# Patient Record
Sex: MALE | Race: WHITE | HISPANIC OR LATINO | Employment: PART TIME | URBAN - METROPOLITAN AREA
[De-identification: names, ages, dates, MRNs, and addresses within clinical notes are randomized per-mention and may not be internally consistent; named-entity substitution may affect disease eponyms.]

---

## 2018-04-23 ENCOUNTER — EVALUATION (OUTPATIENT)
Dept: PHYSICAL THERAPY | Facility: CLINIC | Age: 63
End: 2018-04-23
Payer: COMMERCIAL

## 2018-04-23 DIAGNOSIS — S82.402D CLOSED FRACTURE OF SHAFT OF LEFT TIBIA AND FIBULA WITH ROUTINE HEALING, SUBSEQUENT ENCOUNTER: Primary | ICD-10-CM

## 2018-04-23 DIAGNOSIS — G89.29 CHRONIC PAIN OF LEFT KNEE: ICD-10-CM

## 2018-04-23 DIAGNOSIS — S82.202D CLOSED FRACTURE OF SHAFT OF LEFT TIBIA AND FIBULA WITH ROUTINE HEALING, SUBSEQUENT ENCOUNTER: Primary | ICD-10-CM

## 2018-04-23 DIAGNOSIS — M25.562 CHRONIC PAIN OF LEFT KNEE: ICD-10-CM

## 2018-04-23 PROCEDURE — G8979 MOBILITY GOAL STATUS: HCPCS | Performed by: PHYSICAL THERAPIST

## 2018-04-23 PROCEDURE — 97162 PT EVAL MOD COMPLEX 30 MIN: CPT | Performed by: PHYSICAL THERAPIST

## 2018-04-23 PROCEDURE — 97535 SELF CARE MNGMENT TRAINING: CPT | Performed by: PHYSICAL THERAPIST

## 2018-04-23 PROCEDURE — G8978 MOBILITY CURRENT STATUS: HCPCS | Performed by: PHYSICAL THERAPIST
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
## 2018-04-30 ENCOUNTER — APPOINTMENT (OUTPATIENT)
Dept: PHYSICAL THERAPY | Facility: CLINIC | Age: 63
End: 2018-04-30
Payer: COMMERCIAL

## 2018-05-02 ENCOUNTER — OFFICE VISIT (OUTPATIENT)
Dept: PHYSICAL THERAPY | Facility: CLINIC | Age: 63
End: 2018-05-02
Payer: COMMERCIAL

## 2018-05-02 DIAGNOSIS — S82.202D CLOSED FRACTURE OF SHAFT OF LEFT TIBIA AND FIBULA WITH ROUTINE HEALING, SUBSEQUENT ENCOUNTER: Primary | ICD-10-CM

## 2018-05-02 DIAGNOSIS — G89.29 CHRONIC PAIN OF LEFT KNEE: ICD-10-CM

## 2018-05-02 DIAGNOSIS — S82.402D CLOSED FRACTURE OF SHAFT OF LEFT TIBIA AND FIBULA WITH ROUTINE HEALING, SUBSEQUENT ENCOUNTER: Primary | ICD-10-CM

## 2018-05-02 DIAGNOSIS — M25.562 CHRONIC PAIN OF LEFT KNEE: ICD-10-CM

## 2018-05-02 PROCEDURE — 97110 THERAPEUTIC EXERCISES: CPT | Performed by: PHYSICAL THERAPIST

## 2018-05-02 PROCEDURE — 97140 MANUAL THERAPY 1/> REGIONS: CPT | Performed by: PHYSICAL THERAPIST

## 2018-05-02 NOTE — PROGRESS NOTES
Daily Note     Today's date: 2018  Patient name: La Bullock  : 1955  MRN: [id number]  Referring provider: Bette Nelson MD  Dx:   Encounter Diagnosis     ICD-10-CM    1  Closed fracture of shaft of left tibia and fibula with routine healing, subsequent encounter S82 202D     S82 402D    2  Chronic pain of left knee M25 562     G89 29                   Subjective: Pt with no new complaints at this time  Pt notes pain has not changed much since IE and continues to note cramping in the R calf with extended periods of walking      Objective: See treatment diary below  Manual  18         BLE PROM    15 min                                                                       Exercise Diary  18         NuStep   L1 10 min         TR/HR   20x         Mini Squats   20x         Standing SLR 3-way   20x         Fwd/Lat Step Ups   20x         Tandem Stance            U Stance            Supine Hip Abd   30x 5" hold         Supine Hip Add   30x Blue         Bridges   30x         SLR            Calf stretch   5x15" hold                                                                                                                               Modalities 18         CP/MHP prn    NP                                            Assessment: Tolerated treatment well  Patient exhibited good technique with therapeutic exercises and would benefit from continued PT  Pt with good tolerance to exercises this visit, no increased pain noted  Plan: Continue per plan of care  Progress treatment as tolerated

## 2018-05-07 ENCOUNTER — OFFICE VISIT (OUTPATIENT)
Dept: PHYSICAL THERAPY | Facility: CLINIC | Age: 63
End: 2018-05-07
Payer: COMMERCIAL

## 2018-05-07 DIAGNOSIS — S82.402D CLOSED FRACTURE OF SHAFT OF LEFT TIBIA AND FIBULA WITH ROUTINE HEALING, SUBSEQUENT ENCOUNTER: Primary | ICD-10-CM

## 2018-05-07 DIAGNOSIS — S82.202D CLOSED FRACTURE OF SHAFT OF LEFT TIBIA AND FIBULA WITH ROUTINE HEALING, SUBSEQUENT ENCOUNTER: Primary | ICD-10-CM

## 2018-05-07 PROCEDURE — 97140 MANUAL THERAPY 1/> REGIONS: CPT

## 2018-05-07 PROCEDURE — 97110 THERAPEUTIC EXERCISES: CPT

## 2018-05-07 NOTE — PROGRESS NOTES
Daily Note     Today's date: 2018  Patient name: Aryan Greenwood  : 1955  MRN: [id number]  Referring provider: Sacha José MD  Dx:   Encounter Diagnosis     ICD-10-CM    1  Closed fracture of shaft of left tibia and fibula with routine healing, subsequent encounter S82 202D     S82 402D                   Subjective:  I always have pain everyone'        Objective: See treatment diary below      Assessment: Tolerated treatment fair  Patient demonstrated fatigue post treatment      Required VC for proper technique  Stressed importance of complying with HEP  Plan: Continue per plan of care         Manual  18       BLE PROM    15 min  15                                                                     Exercise Diary  18       NuStep   L1 10 min  L1 10       TR/HR   20x  20       Mini Squats   20x  20       Standing SLR 3-way   20x  20       Fwd/Lat Step Ups   20x  20       Tandem Stance            U Stance            Supine Hip Abd   30x 5" hold         Supine Hip Add   30x Blue         Bridges   30x         SLR            Calf stretch   5x15" hold                                                                                                                               Modalities 18       CP/MHP prn    NP

## 2018-05-18 ENCOUNTER — TELEPHONE (OUTPATIENT)
Dept: PAIN MEDICINE | Facility: MEDICAL CENTER | Age: 63
End: 2018-05-18

## 2018-05-18 NOTE — TELEPHONE ENCOUNTER
[address]- patient called to schedule  Intake completed and sent to ES office  Patient to have order and office notes sent to PCP  Patient to also have prior pain records sent for review  Fax# given
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
## 2018-05-30 ENCOUNTER — TELEPHONE (OUTPATIENT)
Dept: NEUROLOGY | Facility: CLINIC | Age: 63
End: 2018-05-30

## 2018-05-30 NOTE — TELEPHONE ENCOUNTER
Moriah from pt's prior pm doctor called and will be faxing over the office notes and med list today

## 2018-05-30 NOTE — TELEPHONE ENCOUNTER
Pt called to see what the next step will be, informed pt of previous task  Pt is aware that we are still awaiting records from previous pain records  Pt is going to call previous pain doctor so that they can fax over records  Fax number given

## 2018-05-30 NOTE — TELEPHONE ENCOUNTER
I called pt back and lm on vm to let him know we haven't received the order or med recs  I called his pcp and she will fax the order

## 2018-05-31 NOTE — TELEPHONE ENCOUNTER
LM on home vm, the mobile vm is full, for pt to call back  Dr Laura Bell reviewed his paperwork and the note on the paperwork is do not schedule

## 2018-06-01 NOTE — TELEPHONE ENCOUNTER
Pt returned call and was made aware that Dr Irasema Badillo does not have anything to offer him  Pt is requesting a call back to explain why he will not see him  States that his current pain specialist will no longer be practicing as of this month and he needs to find another pain doctor to continue his care  He does not know what else to do at this point  Pt can be reached at [phone]

## 2018-06-05 NOTE — TELEPHONE ENCOUNTER
Please advise patient that he is on high dose opiates (oxycodone 30mg 3x/day); and I do not feel comfortable taking over his opiate regimen  Thank you

## 2019-08-08 ENCOUNTER — HOSPITAL ENCOUNTER (OUTPATIENT)
Facility: HOSPITAL | Age: 64
Setting detail: OBSERVATION
Discharge: HOME | End: 2019-08-09
Attending: EMERGENCY MEDICINE | Admitting: INTERNAL MEDICINE
Payer: COMMERCIAL

## 2019-08-08 ENCOUNTER — APPOINTMENT (EMERGENCY)
Dept: RADIOLOGY | Facility: HOSPITAL | Age: 64
End: 2019-08-08
Payer: COMMERCIAL

## 2019-08-08 ENCOUNTER — APPOINTMENT (EMERGENCY)
Dept: CT IMAGING | Facility: HOSPITAL | Age: 64
End: 2019-08-08
Payer: COMMERCIAL

## 2019-08-08 DIAGNOSIS — R77.8 ELEVATED TROPONIN: ICD-10-CM

## 2019-08-08 DIAGNOSIS — R42 DIZZINESS: Primary | ICD-10-CM

## 2019-08-08 DIAGNOSIS — N18.9 CKD (CHRONIC KIDNEY DISEASE): ICD-10-CM

## 2019-08-08 DIAGNOSIS — D72.829 LEUKOCYTOSIS: ICD-10-CM

## 2019-08-08 PROBLEM — I21.4 NSTEMI (NON-ST ELEVATED MYOCARDIAL INFARCTION) (HCC): Status: ACTIVE | Noted: 2019-08-08

## 2019-08-08 PROBLEM — I25.10 ASHD (ARTERIOSCLEROTIC HEART DISEASE): Status: ACTIVE | Noted: 2017-05-23

## 2019-08-08 PROBLEM — C64.1 RENAL CELL CARCINOMA, RIGHT (HCC): Status: ACTIVE | Noted: 2017-08-23

## 2019-08-08 PROBLEM — R79.89 ELEVATED TROPONIN: Status: ACTIVE | Noted: 2019-08-08

## 2019-08-08 PROBLEM — N18.30 ACUTE RENAL FAILURE SUPERIMPOSED ON STAGE 3 CHRONIC KIDNEY DISEASE (HCC): Status: ACTIVE | Noted: 2019-08-08

## 2019-08-08 PROBLEM — N17.9 ACUTE RENAL FAILURE SUPERIMPOSED ON STAGE 3 CHRONIC KIDNEY DISEASE (HCC): Status: ACTIVE | Noted: 2019-08-08

## 2019-08-08 LAB
ALBUMIN SERPL BCP-MCNC: 4.2 G/DL (ref 3.5–5.7)
ALP SERPL-CCNC: 58 U/L (ref 55–165)
ALT SERPL W P-5'-P-CCNC: 9 U/L (ref 7–52)
ANION GAP SERPL CALCULATED.3IONS-SCNC: 8 MMOL/L (ref 4–13)
APTT PPP: 32 SECONDS (ref 23–37)
AST SERPL W P-5'-P-CCNC: 12 U/L (ref 13–39)
ATRIAL RATE: 100 BPM
BASOPHILS # BLD AUTO: 0 THOUSANDS/ΜL (ref 0–0.1)
BASOPHILS NFR BLD AUTO: 0 % (ref 0–2)
BILIRUB SERPL-MCNC: 0.6 MG/DL (ref 0.2–1)
BNP SERPL-MCNC: 112 PG/ML (ref 1–100)
BUN SERPL-MCNC: 20 MG/DL (ref 7–25)
CALCIUM SERPL-MCNC: 9.5 MG/DL (ref 8.6–10.5)
CHLORIDE SERPL-SCNC: 103 MMOL/L (ref 98–107)
CO2 SERPL-SCNC: 24 MMOL/L (ref 21–31)
CREAT SERPL-MCNC: 1.63 MG/DL (ref 0.7–1.3)
EOSINOPHIL # BLD AUTO: 0 THOUSAND/ΜL (ref 0–0.61)
EOSINOPHIL NFR BLD AUTO: 0 % (ref 0–5)
ERYTHROCYTE [DISTWIDTH] IN BLOOD BY AUTOMATED COUNT: 13.9 % (ref 11.5–14.5)
GFR SERPL CREATININE-BSD FRML MDRD: 43 ML/MIN/1.73SQ M
GLUCOSE SERPL-MCNC: 139 MG/DL (ref 65–99)
HCT VFR BLD AUTO: 45.4 % (ref 42–47)
HGB BLD-MCNC: 16.1 G/DL (ref 14–18)
INR PPP: 1.09 (ref 0.9–1.5)
LYMPHOCYTES # BLD AUTO: 1.2 THOUSANDS/ΜL (ref 0.6–4.47)
LYMPHOCYTES NFR BLD AUTO: 7 % (ref 21–51)
MCH RBC QN AUTO: 32.7 PG (ref 26–34)
MCHC RBC AUTO-ENTMCNC: 35.4 G/DL (ref 31–37)
MCV RBC AUTO: 92 FL (ref 81–99)
MONOCYTES # BLD AUTO: 0.9 THOUSAND/ΜL (ref 0.17–1.22)
MONOCYTES NFR BLD AUTO: 5 % (ref 2–12)
NEUTROPHILS # BLD AUTO: 15 THOUSANDS/ΜL (ref 1.4–6.5)
NEUTS SEG NFR BLD AUTO: 87 % (ref 42–75)
P AXIS: 81 DEGREES
PLATELET # BLD AUTO: 172 THOUSANDS/UL (ref 149–390)
PMV BLD AUTO: 8.3 FL (ref 8.6–11.7)
POTASSIUM SERPL-SCNC: 4.5 MMOL/L (ref 3.5–5.5)
PR INTERVAL: 148 MS
PROT SERPL-MCNC: 6.8 G/DL (ref 6.4–8.9)
PROTHROMBIN TIME: 12.6 SECONDS (ref 10.2–13)
QRS AXIS: 30 DEGREES
QRSD INTERVAL: 92 MS
QT INTERVAL: 344 MS
QTC INTERVAL: 443 MS
RBC # BLD AUTO: 4.92 MILLION/UL (ref 4.3–5.9)
SODIUM SERPL-SCNC: 135 MMOL/L (ref 134–143)
T WAVE AXIS: 61 DEGREES
TROPONIN I SERPL-MCNC: 0.17 NG/ML
TROPONIN I SERPL-MCNC: 0.27 NG/ML
TROPONIN I SERPL-MCNC: 0.5 NG/ML
VENTRICULAR RATE: 100 BPM
WBC # BLD AUTO: 17.2 THOUSAND/UL (ref 4.8–10.8)

## 2019-08-08 PROCEDURE — 84484 ASSAY OF TROPONIN QUANT: CPT | Performed by: EMERGENCY MEDICINE

## 2019-08-08 PROCEDURE — 96361 HYDRATE IV INFUSION ADD-ON: CPT

## 2019-08-08 PROCEDURE — 85025 COMPLETE CBC W/AUTO DIFF WBC: CPT | Performed by: EMERGENCY MEDICINE

## 2019-08-08 PROCEDURE — 99220 PR INITIAL OBSERVATION CARE/DAY 70 MINUTES: CPT | Performed by: INTERNAL MEDICINE

## 2019-08-08 PROCEDURE — 80053 COMPREHEN METABOLIC PANEL: CPT | Performed by: EMERGENCY MEDICINE

## 2019-08-08 PROCEDURE — 71045 X-RAY EXAM CHEST 1 VIEW: CPT

## 2019-08-08 PROCEDURE — 85610 PROTHROMBIN TIME: CPT | Performed by: EMERGENCY MEDICINE

## 2019-08-08 PROCEDURE — 93010 ELECTROCARDIOGRAM REPORT: CPT | Performed by: INTERNAL MEDICINE

## 2019-08-08 PROCEDURE — 83880 ASSAY OF NATRIURETIC PEPTIDE: CPT | Performed by: EMERGENCY MEDICINE

## 2019-08-08 PROCEDURE — 99285 EMERGENCY DEPT VISIT HI MDM: CPT

## 2019-08-08 PROCEDURE — 96360 HYDRATION IV INFUSION INIT: CPT

## 2019-08-08 PROCEDURE — 85730 THROMBOPLASTIN TIME PARTIAL: CPT | Performed by: EMERGENCY MEDICINE

## 2019-08-08 PROCEDURE — 36415 COLL VENOUS BLD VENIPUNCTURE: CPT | Performed by: EMERGENCY MEDICINE

## 2019-08-08 PROCEDURE — 84484 ASSAY OF TROPONIN QUANT: CPT | Performed by: INTERNAL MEDICINE

## 2019-08-08 PROCEDURE — 70450 CT HEAD/BRAIN W/O DYE: CPT

## 2019-08-08 PROCEDURE — 93005 ELECTROCARDIOGRAM TRACING: CPT

## 2019-08-08 RX ORDER — MECLIZINE HCL 12.5 MG/1
25 TABLET ORAL ONCE
Status: COMPLETED | OUTPATIENT
Start: 2019-08-08 | End: 2019-08-08

## 2019-08-08 RX ORDER — OXYCODONE HYDROCHLORIDE 10 MG/1
30 TABLET ORAL EVERY 6 HOURS PRN
Status: DISCONTINUED | OUTPATIENT
Start: 2019-08-08 | End: 2019-08-09 | Stop reason: HOSPADM

## 2019-08-08 RX ORDER — OXYCODONE HYDROCHLORIDE 30 MG/1
30 TABLET ORAL 4 TIMES DAILY
COMMUNITY
Start: 2017-04-04

## 2019-08-08 RX ORDER — CLOPIDOGREL BISULFATE 75 MG/1
75 TABLET ORAL DAILY
COMMUNITY
Start: 2017-03-02

## 2019-08-08 RX ORDER — ACETAMINOPHEN 325 MG/1
650 TABLET ORAL EVERY 6 HOURS PRN
Status: DISCONTINUED | OUTPATIENT
Start: 2019-08-08 | End: 2019-08-09 | Stop reason: HOSPADM

## 2019-08-08 RX ORDER — DOXAZOSIN MESYLATE 4 MG/1
4 TABLET ORAL DAILY
COMMUNITY
Start: 2017-03-02

## 2019-08-08 RX ORDER — DOXAZOSIN MESYLATE 4 MG/1
4 TABLET ORAL DAILY
Status: DISCONTINUED | OUTPATIENT
Start: 2019-08-08 | End: 2019-08-09 | Stop reason: HOSPADM

## 2019-08-08 RX ORDER — ASPIRIN 81 MG/1
81 TABLET ORAL DAILY
Status: DISCONTINUED | OUTPATIENT
Start: 2019-08-08 | End: 2019-08-09 | Stop reason: HOSPADM

## 2019-08-08 RX ORDER — ASPIRIN 325 MG
325 TABLET ORAL ONCE
Status: COMPLETED | OUTPATIENT
Start: 2019-08-08 | End: 2019-08-08

## 2019-08-08 RX ORDER — CLOPIDOGREL BISULFATE 75 MG/1
75 TABLET ORAL DAILY
Status: DISCONTINUED | OUTPATIENT
Start: 2019-08-08 | End: 2019-08-09 | Stop reason: HOSPADM

## 2019-08-08 RX ORDER — ATORVASTATIN CALCIUM 40 MG/1
40 TABLET, FILM COATED ORAL
COMMUNITY
Start: 2017-03-06

## 2019-08-08 RX ORDER — FINASTERIDE 5 MG/1
5 TABLET, FILM COATED ORAL DAILY
COMMUNITY
Start: 2017-03-02

## 2019-08-08 RX ORDER — ATORVASTATIN CALCIUM 40 MG/1
40 TABLET, FILM COATED ORAL
Status: DISCONTINUED | OUTPATIENT
Start: 2019-08-08 | End: 2019-08-09 | Stop reason: HOSPADM

## 2019-08-08 RX ORDER — FINASTERIDE 5 MG/1
5 TABLET, FILM COATED ORAL DAILY
Status: DISCONTINUED | OUTPATIENT
Start: 2019-08-08 | End: 2019-08-09 | Stop reason: HOSPADM

## 2019-08-08 RX ORDER — ONDANSETRON 2 MG/ML
4 INJECTION INTRAMUSCULAR; INTRAVENOUS EVERY 6 HOURS PRN
Status: DISCONTINUED | OUTPATIENT
Start: 2019-08-08 | End: 2019-08-09 | Stop reason: HOSPADM

## 2019-08-08 RX ORDER — HEPARIN SODIUM 5000 [USP'U]/ML
5000 INJECTION, SOLUTION INTRAVENOUS; SUBCUTANEOUS EVERY 8 HOURS SCHEDULED
Status: DISCONTINUED | OUTPATIENT
Start: 2019-08-08 | End: 2019-08-09 | Stop reason: HOSPADM

## 2019-08-08 RX ADMIN — CLOPIDOGREL BISULFATE 75 MG: 75 TABLET ORAL at 16:00

## 2019-08-08 RX ADMIN — SODIUM CHLORIDE 1000 ML: 0.9 INJECTION, SOLUTION INTRAVENOUS at 11:32

## 2019-08-08 RX ADMIN — ATORVASTATIN CALCIUM 40 MG: 40 TABLET, FILM COATED ORAL at 21:41

## 2019-08-08 RX ADMIN — DOXAZOSIN 4 MG: 4 TABLET ORAL at 16:00

## 2019-08-08 RX ADMIN — MECLIZINE 25 MG: 12.5 TABLET ORAL at 11:57

## 2019-08-08 RX ADMIN — ASPIRIN 325 MG: 325 TABLET, FILM COATED ORAL at 13:13

## 2019-08-08 RX ADMIN — HEPARIN SODIUM 5000 UNITS: 5000 INJECTION, SOLUTION INTRAVENOUS; SUBCUTANEOUS at 21:41

## 2019-08-08 RX ADMIN — FINASTERIDE 5 MG: 5 TABLET, FILM COATED ORAL at 16:00

## 2019-08-08 RX ADMIN — HEPARIN SODIUM 5000 UNITS: 5000 INJECTION, SOLUTION INTRAVENOUS; SUBCUTANEOUS at 16:02

## 2019-08-08 RX ADMIN — OXYCODONE HYDROCHLORIDE 30 MG: 10 TABLET ORAL at 17:44

## 2019-08-08 RX ADMIN — ASPIRIN 81 MG: 81 TABLET, COATED ORAL at 16:00

## 2019-08-08 NOTE — H&P
H&P- Jacobo Argueta 1955, 75 y o  male MRN: [id number]    Unit/Bed#: -01 Encounter: [id number]    Primary Care Provider: No primary care provider on file  Date and time admitted to hospital: 8/8/2019 11:07 AM        * Elevated troponin  Assessment & Plan  · Patient has been chest pain-free prior to admission denies any chest pain at this time  · Troponins trending up  · Will continue aspirin/Plavix  · Monitor on tele  · Trend troponins  · Cardiology consult  · Check echo  · EKG with no ST elevations noted  · Patient with normal stress test done in April 2019    Acute renal failure superimposed on stage 3 chronic kidney disease ([facility] [address] )  Assessment & Plan  · Baseline creatinine around 1 4  · Avoid any nephrotoxic meds  · nephro consult  · Hx of right nephrectomy    Renal cell carcinoma, right ([facility] [address] )  Assessment & Plan  · Status post right nephrectomy  · Patient with left renal mass as well  · Continue outpatient follow-up    ASHD (arteriosclerotic heart disease)  Assessment & Plan  · Continue home medications  · Will follow up echo  · Cardiology consult    HLD (hyperlipidemia)  Assessment & Plan  · Continue statin      VTE Prophylaxis: Heparin  Code Status: full code  POLST: There is no POLST form on file for this patient (pre-hospital)  Discussion with family:  No family at the bedside during my evaluation    Anticipated Length of Stay:  Patient will be admitted on an Observation basis with an anticipated length of stay of  < 2 midnights  Justification for Hospital Stay:  Acute kidney injury    Chief Complaint:   Dizziness    History of Present Illness:    Jacobo Argueta is a 75 y o  male who presents with dizziness  Patient states that yesterday while driving to [place] for medical evaluation he developed dizziness/lightheadedness while in the car  Patient pulled over and rested for a little bit symptoms slightly improved and he drove himself to a donut shop near by    Patient thought his sugar was low and got something to eat  Symptoms gradually improved  Today patient had a similar episode and came to the ER for further evaluation  Patient denies any chest pain or shortness of breath  No recent trauma  No fever or chills  No nausea or vomiting  Patient has been eating and drinking okay  Patient recently had a stress test done in April of 2019 which was within normal limits  Findings did show an old scar which patient does have a history of coronary artery disease from the past     Review of Systems:  Review of Systems   Constitutional: Negative for chills and fever  Respiratory: Negative for cough and shortness of breath  Cardiovascular: Negative for chest pain  Gastrointestinal: Negative for abdominal pain, nausea and vomiting  Genitourinary: Negative for dysuria  Neurological: Positive for dizziness and light-headedness  Negative for speech difficulty and weakness  All other systems reviewed and are negative  Past Medical and Surgical History:   History reviewed  No pertinent past medical history  History reviewed  No pertinent surgical history  Meds/Allergies:  Prior to Admission medications    Medication Sig Start Date End Date Taking? Authorizing Provider   ASPIRIN 81 PO Take 81 mg by mouth daily   Yes Historical Provider, MD   atorvastatin (LIPITOR) 40 mg tablet Take 40 mg by mouth daily at bedtime 3/6/17  Yes Historical Provider, MD   clopidogrel (PLAVIX) 75 mg tablet Take 75 mg by mouth daily 3/2/17  Yes Historical Provider, MD   doxazosin (CARDURA) 4 mg tablet Take 4 mg by mouth daily  3/2/17  Yes Historical Provider, MD   finasteride (PROSCAR) 5 mg tablet Take 5 mg by mouth daily 3/2/17  Yes Historical Provider, MD   oxyCODONE (ROXICODONE) 30 MG immediate release tablet Take 30 mg by mouth 4 (four) times a day 4/4/17  Yes Historical Provider, MD     I have reviewed home medications with patient personally      Allergies: No Known Allergies    Social History:  Marital Status:    Occupation:  Patient is an [employer]   Patient Pre-hospital Living Situation:  Lives with family  Patient Pre-hospital Level of Mobility:  Ambulatory  Patient Pre-hospital Diet Restrictions:  None  Substance Use History:   Social History     Substance and Sexual Activity   Alcohol Use Not Currently     Social History     Tobacco Use   Smoking Status Never Smoker   Smokeless Tobacco Never Used     Social History     Substance and Sexual Activity   Drug Use Not Currently       Family History:  Patient denies any history of early coronary disease or malignancies in the immediate family    Physical Exam:   Vitals:   Blood Pressure: 102/61 (08/08/19 1403)  Pulse: 94 (08/08/19 1401)  Temperature: 99 9 °F (37 7 °C) (08/08/19 1401)  Temp Source: Temporal (08/08/19 1401)  Respirations: 22 (08/08/19 1401)  Height: 5' 10" (177 8 cm) (08/08/19 1401)  Weight - Scale: 93 2 kg (205 lb 6 oz) (08/08/19 1401)  SpO2: 94 % (08/08/19 1401)    Physical Exam   Constitutional: He appears well-developed  No distress  HENT:   Head: Normocephalic and atraumatic  Eyes: Conjunctivae and EOM are normal    Neck: Normal range of motion  Neck supple  Cardiovascular: Normal rate and regular rhythm  Pulmonary/Chest: Effort normal  No respiratory distress  Abdominal: Soft  He exhibits no distension  There is no tenderness  Musculoskeletal: Normal range of motion  He exhibits no edema  Neurological: He is alert  No cranial nerve deficit  Skin: Skin is warm and dry  Psychiatric: He has a normal mood and affect  His behavior is normal        Additional Data:   Lab Results: I have personally reviewed pertinent reports      Results from last 7 days   Lab Units 08/08/19  1130   WBC Thousand/uL 17 20*   HEMOGLOBIN g/dL 16 1   HEMATOCRIT % 45 4   PLATELETS Thousands/uL 172   NEUTROS PCT % 87*   LYMPHS PCT % 7*   MONOS PCT % 5   EOS PCT % 0     Results from last 7 days   Lab Units 08/08/19  1130   POTASSIUM mmol/L 4 5   CHLORIDE mmol/L 103   CO2 mmol/L 24   BUN mg/dL 20   CREATININE mg/dL 1 63*   CALCIUM mg/dL 9 5   ALK PHOS U/L 58   ALT U/L 9   AST U/L 12*     Results from last 7 days   Lab Units 08/08/19  1130   INR  1 09               Imaging: I have personally reviewed pertinent reports  XR chest 1 view portable   ED Interpretation by Tanner Reddy DO (08/08 1308)   No acute disease      Final Result by Gena Arango MD (08/08 1332)      No acute cardiopulmonary disease  Workstation performed: [id number]         CT head without contrast   Final Result by Nai Adame MD (08/08 1245)      No acute intracranial abnormality  Mild scattered sinus mucosal thickening is noted  Workstation performed: [id number]             [employer] / [facility] Records Reviewed: Yes     ** Please Note: This note has been constructed using a voice recognition system   **

## 2019-08-08 NOTE — ASSESSMENT & PLAN NOTE
· Patient has been chest pain-free prior to admission denies any chest pain at this time  · Troponins trending up  · Will continue aspirin/Plavix  · Monitor on tele  · Trend troponins  · Cardiology consult  · Check echo  · EKG with no ST elevations noted  · Patient with normal stress test done in April 2019

## 2019-08-08 NOTE — ASSESSMENT & PLAN NOTE
· Baseline creatinine around 1 4  · Avoid any nephrotoxic meds  · nephro consult  · Hx of right nephrectomy

## 2019-08-08 NOTE — ED PROVIDER NOTES
History  Chief Complaint   Patient presents with    Dizziness     Patient is a 66-year-old male presents the emergency department complaining of dizziness described as well room spinning worse with movement of the head left and right and ambulation  Started yesterday evening still present not improving this morning no chest pain no shortness of breath no palpitations  History provided by:  Patient  Dizziness   Quality:  Head spinning and room spinning  Severity:  Moderate  Onset quality:  Sudden  Duration:  1 day  Timing:  Constant  Progression:  Worsening  Chronicity:  New  Context: head movement    Associated symptoms: no chest pain, no diarrhea, no headaches, no nausea, no palpitations, no shortness of breath, no vomiting and no weakness        Prior to Admission Medications   Prescriptions Last Dose Informant Patient Reported? Taking? ASPIRIN 81 PO   Yes No   Sig: Take 81 mg by mouth daily   atorvastatin (LIPITOR) 40 mg tablet   Yes Yes   Sig: Take 40 mg by mouth daily at bedtime   clopidogrel (PLAVIX) 75 mg tablet   Yes Yes   Sig: Take 75 mg by mouth daily   doxazosin (CARDURA) 4 mg tablet   Yes Yes   Sig: Take 4 mg by mouth daily    finasteride (PROSCAR) 5 mg tablet   Yes Yes   Sig: Take 5 mg by mouth daily   oxyCODONE (ROXICODONE) 30 MG immediate release tablet   Yes Yes   Sig: Take 30 mg by mouth 4 (four) times a day      Facility-Administered Medications: None       History reviewed  No pertinent past medical history  History reviewed  No pertinent surgical history  History reviewed  No pertinent family history  I have reviewed and agree with the history as documented  Social History     Tobacco Use    Smoking status: Never Smoker    Smokeless tobacco: Never Used   Substance Use Topics    Alcohol use: Not Currently    Drug use: Not Currently        Review of Systems   Constitutional: Negative for activity change, appetite change, chills, fatigue and fever     HENT: Negative for congestion, ear pain, rhinorrhea and sore throat  Eyes: Negative for discharge, redness and visual disturbance  Respiratory: Negative for cough, chest tightness, shortness of breath and wheezing  Cardiovascular: Negative for chest pain and palpitations  Gastrointestinal: Negative for abdominal pain, constipation, diarrhea, nausea and vomiting  Endocrine: Negative for polydipsia and polyuria  Genitourinary: Negative for difficulty urinating, dysuria, frequency, hematuria and urgency  Musculoskeletal: Negative for arthralgias and myalgias  Skin: Negative for color change, pallor and rash  Neurological: Positive for dizziness  Negative for weakness, light-headedness, numbness and headaches  Hematological: Negative for adenopathy  Does not bruise/bleed easily  All other systems reviewed and are negative  Physical Exam  Physical Exam   Constitutional: He is oriented to person, place, and time  He appears well-developed and well-nourished  HENT:   Head: Normocephalic and atraumatic  Right Ear: External ear normal    Left Ear: External ear normal    Nose: Nose normal    Mouth/Throat: Oropharynx is clear and moist    Eyes: Pupils are equal, round, and reactive to light  Conjunctivae and EOM are normal    Neck: Normal range of motion  Neck supple  Cardiovascular: Normal rate, regular rhythm, normal heart sounds and intact distal pulses  Pulmonary/Chest: Effort normal and breath sounds normal  No respiratory distress  He has no wheezes  He has no rales  He exhibits no tenderness  Abdominal: Soft  Bowel sounds are normal  He exhibits no distension  There is no tenderness  There is no guarding  Musculoskeletal: Normal range of motion  Neurological: He is alert and oriented to person, place, and time  No cranial nerve deficit or sensory deficit  Skin: Skin is warm and dry  Psychiatric: He has a normal mood and affect  Nursing note and vitals reviewed        Vital Signs  ED Triage Vitals [08/08/19 1111]   Temperature Pulse Respirations Blood Pressure SpO2   99 1 °F (37 3 °C) (!) 106 18 114/63 96 %      Temp Source Heart Rate Source Patient Position - Orthostatic VS BP Location FiO2 (%)   Temporal Monitor Sitting Left arm --      Pain Score       No Pain           Vitals:    08/08/19 1111 08/08/19 1215 08/08/19 1315 08/08/19 1330   BP: 114/63  100/60 107/60   Pulse: (!) 106 94 95 97   Patient Position - Orthostatic VS: Sitting            Visual Acuity      ED Medications  Medications   sodium chloride 0 9 % bolus 1,000 mL (0 mL Intravenous Stopped 8/8/19 1314)   meclizine (ANTIVERT) tablet 25 mg (25 mg Oral Given 8/8/19 1157)   aspirin tablet 325 mg (325 mg Oral Given 8/8/19 1313)       Diagnostic Studies  Results Reviewed     Procedure Component Value Units Date/Time    B-Type Natriuretic Peptide [facility] ONLY) [[id number]]  (Abnormal) Collected:  08/08/19 1130    Lab Status:  Final result Specimen:  Blood from Arm, Left Updated:  08/08/19 1211      pg/mL     Troponin I [[id number]]  (Abnormal) Collected:  08/08/19 1130    Lab Status:  Final result Specimen:  Blood from Arm, Left Updated:  08/08/19 1202     Troponin I 0 17 ng/mL     Comprehensive metabolic panel [[id number]]  (Abnormal) Collected:  08/08/19 1130    Lab Status:  Final result Specimen:  Blood from Arm, Left Updated:  08/08/19 1200     Sodium 135 mmol/L      Potassium 4 5 mmol/L      Chloride 103 mmol/L      CO2 24 mmol/L      ANION GAP 8 mmol/L      BUN 20 mg/dL      Creatinine 1 63 mg/dL      Glucose 139 mg/dL      Calcium 9 5 mg/dL      AST 12 U/L      ALT 9 U/L      Alkaline Phosphatase 58 U/L      Total Protein 6 8 g/dL      Albumin 4 2 g/dL      Total Bilirubin 0 60 mg/dL      eGFR 43 ml/min/1 73sq m     Narrative:       Chasity guidelines for Chronic Kidney Disease (CKD):     Stage 1 with normal or high GFR (GFR > 90 mL/min/1 73 square meters)    Stage 2 Mild CKD (GFR = 60-89 mL/min/1 73 square meters)    Stage 3A Moderate CKD (GFR = 45-59 mL/min/1 73 square meters)    Stage 3B Moderate CKD (GFR = 30-44 mL/min/1 73 square meters)    Stage 4 Severe CKD (GFR = 15-29 mL/min/1 73 square meters)    Stage 5 End Stage CKD (GFR <15 mL/min/1 73 square meters)  Note: GFR calculation is accurate only with a steady state creatinine    Protime-INR [[id number]]  (Normal) Collected:  08/08/19 1130    Lab Status:  Final result Specimen:  Blood from Arm, Left Updated:  08/08/19 1155     Protime 12 6 seconds      INR 1 09    APTT [[id number]]  (Normal) Collected:  08/08/19 1130    Lab Status:  Final result Specimen:  Blood from Arm, Left Updated:  08/08/19 1155     PTT 32 seconds     CBC and differential [[id number]]  (Abnormal) Collected:  08/08/19 1130    Lab Status:  Final result Specimen:  Blood from Arm, Left Updated:  08/08/19 1147     WBC 17 20 Thousand/uL      RBC 4 92 Million/uL      Hemoglobin 16 1 g/dL      Hematocrit 45 4 %      MCV 92 fL      MCH 32 7 pg      MCHC 35 4 g/dL      RDW 13 9 %      MPV 8 3 fL      Platelets 297 Thousands/uL      Neutrophils Relative 87 %      Lymphocytes Relative 7 %      Monocytes Relative 5 %      Eosinophils Relative 0 %      Basophils Relative 0 %      Neutrophils Absolute 15 00 Thousands/µL      Lymphocytes Absolute 1 20 Thousands/µL      Monocytes Absolute 0 90 Thousand/µL      Eosinophils Absolute 0 00 Thousand/µL      Basophils Absolute 0 00 Thousands/µL                  XR chest 1 view portable   ED Interpretation by Gonzalo Neff DO (08/08 1308)   No acute disease      Final Result by Selam Nash MD (08/08 1332)      No acute cardiopulmonary disease  Workstation performed: [id number]         CT head without contrast   Final Result by Jessica Freire MD (08/08 1245)      No acute intracranial abnormality  Mild scattered sinus mucosal thickening is noted                Workstation performed: [id number]                    Procedures  ECG 12 Lead Documentation Only  Date/Time: 8/8/2019 12:09 PM  Performed by: Gonzalo Neff DO  Authorized by: Gonzalo Neff DO     ECG reviewed by me, the ED Provider: yes    Patient location:  ED  Previous ECG:     Comparison to cardiac monitor: Yes    Quality:     Tracing quality:  Limited by artifact  Rate:     ECG rate:  99    ECG rate assessment: normal    Rhythm:     Rhythm: sinus rhythm    QRS:     QRS axis:  Left  Conduction:     Conduction: abnormal      Abnormal conduction: incomplete RBBB    ST segments:     ST segments:  Normal  T waves:     T waves: normal             ED Course  ED Course as of Aug 08 1345   Thu Aug 08, 2019   1318 Spoke with Dr Jaron Alexis for Cardiology reviewed case and findings in the emergency department he recommends admitting the patient med surge telemetry observation for further evaluation treatment and monitoring given elevated troponin treating with aspirin for now  1334 WBC(!): 17 20   1341 Spoke with Dr Aixa Carnes hospitalist on-call reviewed case and findings in the emergency department he accepts for observation                                      MDM  Number of Diagnoses or Management Options  CKD (chronic kidney disease): new and requires workup  Dizziness: new and requires workup  Elevated troponin: new and requires workup  Leukocytosis: new and requires workup     Amount and/or Complexity of Data Reviewed  Clinical lab tests: ordered and reviewed  Tests in the radiology section of CPT®: reviewed and ordered  Tests in the medicine section of CPT®: ordered and reviewed  Decide to obtain previous medical records or to obtain history from someone other than the patient: yes  Review and summarize past medical records: yes  Independent visualization of images, tracings, or specimens: yes    Risk of Complications, Morbidity, and/or Mortality  Presenting problems: moderate  Diagnostic procedures: moderate  Management options: moderate    Patient Progress  Patient progress: stable      Disposition  Final diagnoses:   Dizziness   Elevated troponin   CKD (chronic kidney disease)   Leukocytosis     Time reflects when diagnosis was documented in both MDM as applicable and the Disposition within this note     Time User Action Codes Description Comment    8/8/2019 12:43 PM Russell Noss Add [R42] Dizziness     8/8/2019 12:44 PM Russell Noss Add [R74 8] Elevated troponin     8/8/2019 12:44 PM Nilda Markham Add [N18 9] CKD (chronic kidney disease)     8/8/2019 12:45 PM Russell Noss Add [D72 829] Leukocytosis       ED Disposition     None      Follow-up Information    None         Patient's Medications   Discharge Prescriptions    No medications on file     No discharge procedures on file      ED Provider  Electronically Signed by           George Regan DO  08/08/19 9507

## 2019-08-08 NOTE — PLAN OF CARE
Problem: PAIN - ADULT  Goal: Verbalizes/displays adequate comfort level or baseline comfort level  Description  Interventions:  - Encourage patient to monitor pain and request assistance  - Assess pain using appropriate pain scale  - Administer analgesics based on type and severity of pain and evaluate response  - Implement non-pharmacological measures as appropriate and evaluate response  - Consider cultural and social influences on pain and pain management  - Notify physician/advanced practitioner if interventions unsuccessful or patient reports new pain  Outcome: Progressing     Problem: INFECTION - ADULT  Goal: Absence or prevention of progression during hospitalization  Description  INTERVENTIONS:  - Assess and monitor for signs and symptoms of infection  - Monitor lab/diagnostic results  - Monitor all insertion sites, i e  indwelling lines, tubes, and drains  - Monitor endotracheal (as able) and nasal secretions for changes in amount and color  - [place] appropriate cooling/warming therapies per order  - Administer medications as ordered  - Instruct and encourage patient and family to use good hand hygiene technique  - Identify and instruct in appropriate isolation precautions for identified infection/condition  Outcome: Progressing  Goal: Absence of fever/infection during neutropenic period  Description  INTERVENTIONS:  - Monitor WBC  - Implement neutropenic guidelines  Outcome: Progressing     Problem: SAFETY ADULT  Goal: Patient will remain free of falls  Description  INTERVENTIONS:  - Assess patient frequently for physical needs  -  Identify cognitive and physical deficits and behaviors that affect risk of falls    -  [place] fall precautions as indicated by assessment   - Educate patient/family on patient safety including physical limitations  - Instruct patient to call for assistance with activity based on assessment  - Modify environment to reduce risk of injury  - Consider OT/PT consult to assist with strengthening/mobility  Outcome: Progressing  Goal: Maintain or return to baseline ADL function  Description  INTERVENTIONS:  -  Assess patient's ability to carry out ADLs; assess patient's baseline for ADL function and identify physical deficits which impact ability to perform ADLs (bathing, care of mouth/teeth, toileting, grooming, dressing, etc )  - Assess/evaluate cause of self-care deficits   - Assess range of motion  - Assess patient's mobility; develop plan if impaired  - Assess patient's need for assistive devices and provide as appropriate  - Encourage maximum independence but intervene and supervise when necessary  ¯ Involve family in performance of ADLs  ¯ Assess for home care needs following discharge   ¯ Request OT consult to assist with ADL evaluation and planning for discharge  ¯ Provide patient education as appropriate  Outcome: Progressing  Goal: Maintain or return mobility status to optimal level  Description  INTERVENTIONS:  - Assess patient's baseline mobility status (ambulation, transfers, stairs, etc )    - Identify cognitive and physical deficits and behaviors that affect mobility  - Identify mobility aids required to assist with transfers and/or ambulation (gait belt, sit-to-stand, lift, walker, cane, etc )  - [place] fall precautions as indicated by assessment  - Record patient progress and toleration of activity level on Mobility SBAR; progress patient to next Phase/Stage  - Instruct patient to call for assistance with activity based on assessment  - Request Rehabilitation consult to assist with strengthening/weightbearing, etc   Outcome: Progressing     Problem: DISCHARGE PLANNING  Goal: Discharge to home or other facility with appropriate resources  Description  INTERVENTIONS:  - Identify barriers to discharge w/patient and caregiver  - Arrange for needed discharge resources and transportation as appropriate  - Identify discharge learning needs (meds, wound care, etc )  - Arrange for interpretive services to assist at discharge as needed  - Refer to Case Management Department for coordinating discharge planning if the patient needs post-hospital services based on physician/advanced practitioner order or complex needs related to functional status, cognitive ability, or social support system  Outcome: Progressing     Problem: Knowledge Deficit  Goal: Patient/family/caregiver demonstrates understanding of disease process, treatment plan, medications, and discharge instructions  Description  Complete learning assessment and assess knowledge base  Interventions:  - Provide teaching at level of understanding  - Provide teaching via preferred learning methods  Outcome: Progressing     Problem: NEUROSENSORY - ADULT  Goal: Achieves stable or improved neurological status  Description  INTERVENTIONS  - Monitor and report changes in neurological status  - Initiate measures to prevent increased intracranial pressure  - Maintain blood pressure and fluid volume within ordered parameters to optimize cerebral perfusion  - Monitor temperature, glucose, and sodium or any other associated labs   Initiate appropriate interventions as ordered  - Monitor for seizure activity   - Administer anti-seizure medications as ordered  Outcome: Progressing  Goal: Absence of seizures  Description  INTERVENTIONS  - Monitor for seizure activity  - Administer anti-seizure medications as ordered  - Monitor neurological status  Outcome: Progressing  Goal: Remains free of injury related to seizures activity  Description  INTERVENTIONS  - Maintain airway, patient safety  and administer oxygen as ordered  - Monitor patient for seizure activity, document and report duration and description of seizure to physician/advanced practitioner  - If seizure occurs,  ensure patient safety during seizure  - Reorient patient post seizure  - Seizure pads on all 4 side rails  - Instruct patient/family to notify RN of any seizure activity including if an aura is experienced  - Instruct patient/family to call for assistance with activity based on nursing assessment  - Administer anti-seizure medications as ordered  - Monitor fetal well being  Outcome: Progressing  Goal: Achieves maximal functionality and self care  Description  INTERVENTIONS  - Monitor swallowing and airway patency with patient fatigue and changes in neurological status  - Encourage and assist patient to increase activity and self care with guidance from rehab services  - Encourage visually impaired, hearing impaired and aphasic patients to use assistive/communication devices  Outcome: Progressing     Problem: CARDIOVASCULAR - ADULT  Goal: Maintains optimal cardiac output and hemodynamic stability  Description  INTERVENTIONS:  - Monitor I/O, vital signs and rhythm  - Monitor for S/S and trends of decreased cardiac output i e  bleeding, hypotension  - Administer and titrate ordered vasoactive medications to optimize hemodynamic stability  - Assess quality of pulses, skin color and temperature  - Assess for signs of decreased coronary artery perfusion - ex   Angina  - Instruct patient to report change in severity of symptoms  Outcome: Progressing  Goal: Absence of cardiac dysrhythmias or at baseline rhythm  Description  INTERVENTIONS:  - Continuous cardiac monitoring, monitor vital signs, obtain 12 lead EKG if indicated  - Administer antiarrhythmic and heart rate control medications as ordered  - Monitor electrolytes and administer replacement therapy as ordered  Outcome: Progressing

## 2019-08-08 NOTE — ASSESSMENT & PLAN NOTE
· Status post right nephrectomy  · Patient with left renal mass as well  · Continue outpatient follow-up

## 2019-08-09 ENCOUNTER — APPOINTMENT (OUTPATIENT)
Dept: NON INVASIVE DIAGNOSTICS | Facility: HOSPITAL | Age: 64
End: 2019-08-09
Payer: COMMERCIAL

## 2019-08-09 ENCOUNTER — APPOINTMENT (OUTPATIENT)
Dept: ULTRASOUND IMAGING | Facility: HOSPITAL | Age: 64
End: 2019-08-09
Payer: COMMERCIAL

## 2019-08-09 VITALS
SYSTOLIC BLOOD PRESSURE: 135 MMHG | RESPIRATION RATE: 18 BRPM | TEMPERATURE: 98.9 F | HEIGHT: 70 IN | BODY MASS INDEX: 29.42 KG/M2 | HEART RATE: 69 BPM | WEIGHT: 205.5 LBS | DIASTOLIC BLOOD PRESSURE: 72 MMHG | OXYGEN SATURATION: 97 %

## 2019-08-09 PROBLEM — R42 DIZZINESS: Status: ACTIVE | Noted: 2019-08-09

## 2019-08-09 LAB
ANION GAP SERPL CALCULATED.3IONS-SCNC: 7 MMOL/L (ref 4–13)
BASOPHILS # BLD AUTO: 0.1 THOUSANDS/ΜL (ref 0–0.1)
BASOPHILS NFR BLD AUTO: 1 % (ref 0–2)
BILIRUB UR QL STRIP: NEGATIVE
BUN SERPL-MCNC: 17 MG/DL (ref 7–25)
CALCIUM SERPL-MCNC: 8.6 MG/DL (ref 8.6–10.5)
CHLORIDE SERPL-SCNC: 109 MMOL/L (ref 98–107)
CLARITY UR: CLEAR
CO2 SERPL-SCNC: 23 MMOL/L (ref 21–31)
COLOR UR: YELLOW
CREAT SERPL-MCNC: 1.52 MG/DL (ref 0.7–1.3)
CREAT UR-MCNC: 61 MG/DL
EOSINOPHIL # BLD AUTO: 0.2 THOUSAND/ΜL (ref 0–0.61)
EOSINOPHIL NFR BLD AUTO: 2 % (ref 0–5)
ERYTHROCYTE [DISTWIDTH] IN BLOOD BY AUTOMATED COUNT: 14.3 % (ref 11.5–14.5)
GFR SERPL CREATININE-BSD FRML MDRD: 47 ML/MIN/1.73SQ M
GLUCOSE SERPL-MCNC: 101 MG/DL (ref 65–99)
GLUCOSE UR STRIP-MCNC: NEGATIVE MG/DL
HCT VFR BLD AUTO: 41.5 % (ref 42–47)
HGB BLD-MCNC: 14.2 G/DL (ref 14–18)
HGB UR QL STRIP.AUTO: NEGATIVE
KETONES UR STRIP-MCNC: NEGATIVE MG/DL
LEUKOCYTE ESTERASE UR QL STRIP: NEGATIVE
LYMPHOCYTES # BLD AUTO: 3 THOUSANDS/ΜL (ref 0.6–4.47)
LYMPHOCYTES NFR BLD AUTO: 36 % (ref 21–51)
MCH RBC QN AUTO: 31.7 PG (ref 26–34)
MCHC RBC AUTO-ENTMCNC: 34.3 G/DL (ref 31–37)
MCV RBC AUTO: 93 FL (ref 81–99)
MICROALBUMIN UR-MCNC: 10 MG/L (ref 0–20)
MICROALBUMIN/CREAT 24H UR: 16 MG/G CREATININE (ref 0–30)
MONOCYTES # BLD AUTO: 0.7 THOUSAND/ΜL (ref 0.17–1.22)
MONOCYTES NFR BLD AUTO: 9 % (ref 2–12)
NEUTROPHILS # BLD AUTO: 4.4 THOUSANDS/ΜL (ref 1.4–6.5)
NEUTS SEG NFR BLD AUTO: 52 % (ref 42–75)
NITRITE UR QL STRIP: NEGATIVE
PH UR STRIP.AUTO: 5.5 [PH]
PLATELET # BLD AUTO: 140 THOUSANDS/UL (ref 149–390)
PMV BLD AUTO: 8.8 FL (ref 8.6–11.7)
POTASSIUM SERPL-SCNC: 3.8 MMOL/L (ref 3.5–5.5)
PROT UR STRIP-MCNC: NEGATIVE MG/DL
PROT UR-MCNC: 8 MG/DL
PROT/CREAT UR: 0.13 MG/G{CREAT} (ref 0–0.1)
RBC # BLD AUTO: 4.48 MILLION/UL (ref 4.3–5.9)
SODIUM 24H UR-SCNC: 77 MOL/L
SODIUM SERPL-SCNC: 139 MMOL/L (ref 134–143)
SP GR UR STRIP.AUTO: 1.01 (ref 1–1.03)
UROBILINOGEN UR QL STRIP.AUTO: 0.2 E.U./DL
WBC # BLD AUTO: 8.3 THOUSAND/UL (ref 4.8–10.8)

## 2019-08-09 PROCEDURE — G8978 MOBILITY CURRENT STATUS: HCPCS

## 2019-08-09 PROCEDURE — 81003 URINALYSIS AUTO W/O SCOPE: CPT | Performed by: INTERNAL MEDICINE

## 2019-08-09 PROCEDURE — 93306 TTE W/DOPPLER COMPLETE: CPT

## 2019-08-09 PROCEDURE — G8988 SELF CARE GOAL STATUS: HCPCS

## 2019-08-09 PROCEDURE — G8979 MOBILITY GOAL STATUS: HCPCS

## 2019-08-09 PROCEDURE — 99217 PR OBSERVATION CARE DISCHARGE MANAGEMENT: CPT | Performed by: NURSE PRACTITIONER

## 2019-08-09 PROCEDURE — 80048 BASIC METABOLIC PNL TOTAL CA: CPT | Performed by: INTERNAL MEDICINE

## 2019-08-09 PROCEDURE — 84300 ASSAY OF URINE SODIUM: CPT | Performed by: INTERNAL MEDICINE

## 2019-08-09 PROCEDURE — 85025 COMPLETE CBC W/AUTO DIFF WBC: CPT | Performed by: INTERNAL MEDICINE

## 2019-08-09 PROCEDURE — G8987 SELF CARE CURRENT STATUS: HCPCS

## 2019-08-09 PROCEDURE — 84156 ASSAY OF PROTEIN URINE: CPT | Performed by: INTERNAL MEDICINE

## 2019-08-09 PROCEDURE — 93306 TTE W/DOPPLER COMPLETE: CPT | Performed by: INTERNAL MEDICINE

## 2019-08-09 PROCEDURE — 97163 PT EVAL HIGH COMPLEX 45 MIN: CPT

## 2019-08-09 PROCEDURE — 99204 OFFICE O/P NEW MOD 45 MIN: CPT | Performed by: INTERNAL MEDICINE

## 2019-08-09 PROCEDURE — 76770 US EXAM ABDO BACK WALL COMP: CPT

## 2019-08-09 PROCEDURE — 82570 ASSAY OF URINE CREATININE: CPT | Performed by: INTERNAL MEDICINE

## 2019-08-09 PROCEDURE — 97167 OT EVAL HIGH COMPLEX 60 MIN: CPT

## 2019-08-09 PROCEDURE — 82043 UR ALBUMIN QUANTITATIVE: CPT | Performed by: INTERNAL MEDICINE

## 2019-08-09 RX ADMIN — CLOPIDOGREL BISULFATE 75 MG: 75 TABLET ORAL at 08:11

## 2019-08-09 RX ADMIN — FINASTERIDE 5 MG: 5 TABLET, FILM COATED ORAL at 08:11

## 2019-08-09 RX ADMIN — OXYCODONE HYDROCHLORIDE 30 MG: 10 TABLET ORAL at 07:50

## 2019-08-09 RX ADMIN — DOXAZOSIN 4 MG: 4 TABLET ORAL at 08:11

## 2019-08-09 RX ADMIN — ASPIRIN 81 MG: 81 TABLET, COATED ORAL at 08:11

## 2019-08-09 RX ADMIN — HEPARIN SODIUM 5000 UNITS: 5000 INJECTION, SOLUTION INTRAVENOUS; SUBCUTANEOUS at 14:09

## 2019-08-09 RX ADMIN — HEPARIN SODIUM 5000 UNITS: 5000 INJECTION, SOLUTION INTRAVENOUS; SUBCUTANEOUS at 05:27

## 2019-08-09 NOTE — ASSESSMENT & PLAN NOTE
Troponin levels are steadily elevating and may indicate Cardiac injury   EKG: Normal sinus rhythm, Incomplete right bundle branch block, Cannot rule out prior      Septal infarct , age undetermined  See comments with regard to echo
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
## 2019-08-09 NOTE — NURSING NOTE
Pt medication from pharmacy retrieved and given to patient as well as money from safe  IV removed and pt belongings gathered  Discharge instructions reviewed with patient  Pt ate dinner and is currently waiting for daughter to arrive and pick him up  Bed low call bell in reach  Will continue to monitor

## 2019-08-09 NOTE — CONSULTS
Consult- Charisma Wilson 1955, 75 y o  male MRN: [id number]    Unit/Bed#: -01 Encounter: [id number]    Primary Care Provider: No primary care provider on file  Date and time admitted to hospital: 8/8/2019 11:07 AM      Inpatient consult to Cardiology  Consult performed by: Cezar Juarez PA-C  Consult ordered by: Delfino Freeman MD          Coronary artery disease involving native coronary artery of native heart  Assessment & Plan  Stenting in 2011  Recent nuclear stress test was negative for ischemia    Echo 4-9-18 shows normal EF without WMA   Repeat echocardiogram if not change in EF or WMA   Patient will be D/C to see his [facility] cardiologist soon and to have further OP w/u  In addition patient is advised to get rest/sleep prior to returning to work as an [employer]-  Given current symptoms, elevation in troponins and EKG findings --If there is any change in echocardiographic findings or WMA   Recommendation for cardiac catheterization  * Elevated troponin  Assessment & Plan  Troponin levels are steadily elevating and may indicate Cardiac injury   EKG: Normal sinus rhythm, Incomplete right bundle branch block, Cannot rule out prior      Septal infarct , age undetermined  See comments with regard to echo       HLD (hyperlipidemia)  Assessment & Plan  Currently statin and tolerating    Would Suggest High dose statin        Thank you for allowing us to see this pleasant patient in consult  We will follow the patient along with you and comment with results of the echo  Chief Complaint:  Chief Complaint   Patient presents with    Dizziness        History of Present Illness: The patient is a 75year old gentleman who is originally from [place]  His currently following with Cardiology at [address] [place] [address] [address]  He has a known history of CAD with stenting at [facility] in [place] in 2011  He has HLD and nephrectomy with CKD      He was driving to [place] yesterday with his wife and daughter and suddenly he became lightheaded and diaphoretic  He was unable to continue driving  He say in the car for about an hour and his daughter took over and brought him to the ED  In the ED troponin levels were elevated and EKG was indicative of lateral ischemia  Prior to his drive, he had not gotten enough sleep as he is an [address]  He was awake for over 24 hours  This morning he is very fatigued and cannot keep from falling asleep  He has no chest pain or FARRELL  He has been doing well otherwise and has no palpitations or syncope  He has had a recent nuclear stress test that was non-ischemic  Review of Systems: a 10 point review of systems was conducted and is negative except for as mentioned in the HPI or as below  Review of Systems   [place]: Positive for diaphoresis and malaise/fatigue  HENT: Negative  Eyes: Negative  Cardiovascular: Positive for near-syncope  Negative for chest pain, claudication, cyanosis, dyspnea on exertion, irregular heartbeat, leg swelling, orthopnea, palpitations, paroxysmal nocturnal dyspnea and syncope  Respiratory: Negative  Negative for cough, hemoptysis, shortness of breath, sleep disturbances due to breathing, snoring, sputum production and wheezing  Endocrine: Negative  Hematologic/Lymphatic: Negative  Skin: Negative  Musculoskeletal: Negative  Gastrointestinal: Negative  Genitourinary: Negative  Neurological: Negative  Psychiatric/Behavioral: Negative  Allergic/Immunologic: Negative  Past Medical and Surgical History:  History reviewed  No pertinent past medical history  History reviewed  No pertinent surgical history  Social History     Substance and Sexual Activity   Alcohol Use Not Currently     Social History     Substance and Sexual Activity   Drug Use Not Currently     Social History     Tobacco Use   Smoking Status Never Smoker   Smokeless Tobacco Never Used       Family History:  History reviewed   No pertinent family history  Medication:  Medications Prior to Admission   Medication    ASPIRIN 81 PO    atorvastatin (LIPITOR) 40 mg tablet    clopidogrel (PLAVIX) 75 mg tablet    doxazosin (CARDURA) 4 mg tablet    finasteride (PROSCAR) 5 mg tablet    oxyCODONE (ROXICODONE) 30 MG immediate release tablet        Allergies:  No Known Allergies    Physical Exam:  Vitals: Blood pressure 121/68, pulse 67, temperature 99 °F (37 2 °C), temperature source Temporal, resp  rate 18, height 5' 10" (1 778 m), weight 93 2 kg (205 lb 8 oz), SpO2 93 %  , Body mass index is 29 49 kg/m² ,   Orthostatic Blood Pressures      Most Recent Value   Blood Pressure  121/68 filed at 08/09/2019 0707   Patient Position - Orthostatic VS  Lying filed at 08/09/2019 8881      , Systolic (15LEC), SWM:338 , Min:100 , LKD:044   , Diastolic (92NGN), OTM:69, Min:58, Max:68    Physical Exam   Constitutional: He is oriented to person, place, and time  He appears well-developed and well-nourished  HENT:   Head: Normocephalic and atraumatic  Mouth/Throat: Oropharynx is clear and moist    Eyes: Conjunctivae are normal  No scleral icterus  Neck: Normal range of motion  Neck supple  No JVD present  No thyromegaly present  Cardiovascular: Normal rate and regular rhythm  Exam reveals no gallop and no friction rub  Murmur heard  Systolic murmur is present with a grade of 2/6 at the upper right sternal border  Pulmonary/Chest: No respiratory distress  He has no wheezes  He has no rales  Abdominal: Soft  Bowel sounds are normal  He exhibits no distension  There is no tenderness  Aorta not palpable   Musculoskeletal: Normal range of motion  He exhibits no edema, tenderness or deformity  Neurological: He is alert and oriented to person, place, and time  Skin: Skin is warm and dry  Psychiatric: He has a normal mood and affect  His behavior is normal    Nursing note and vitals reviewed          Most Recent Cardiac Imaging:   Echo 4-9-18:  Normal left ventricular systolic function  Normal regional wall motion      Normal left ventricular wall thickness  Estimated left ventricular ejection     fraction is 60%       Normal right ventricular size and function       Mildly dilated left atrium      Aortic sclerosis without stenosis       Mild tricuspid regurgitation      Moderate diastolic dysfunction      Visually Estimated LV Ejection Fraction is:60%     EKG: Normal sinus rhythm, Incomplete right bundle branch block, Cannot rule out prior, Septal infarct , age undetermined    Lab Results:   Troponins:   Results from last 7 days   Lab Units 08/08/19  2030 08/08/19  1430 08/08/19  1130   TROPONIN I ng/mL 0 50* 0 27* 0 17*     BNP:  Results from last 6 Months   Lab Units 08/08/19  1130   BNP pg/mL 112*     CBC with diff:   Results from last 7 days   Lab Units 08/09/19  0446 08/08/19  1130   WBC Thousand/uL 8 30 17 20*   HEMOGLOBIN g/dL 14 2 16 1   HEMATOCRIT % 41 5* 45 4   PLATELETS Thousands/uL 140* 172   RBC Million/uL 4 48 4 92     CMP:  Results from last 7 days   Lab Units 08/09/19  0446 08/08/19  1130   POTASSIUM mmol/L 3 8 4 5   CHLORIDE mmol/L 109* 103   BUN mg/dL 17 20   CREATININE mg/dL 1 52* 1 63*   CALCIUM mg/dL 8 6 9 5   AST U/L  --  12*   ALT U/L  --  9   ALK PHOS U/L  --  58   EGFR ml/min/1 73sq m 47 43     Lipid Profile:

## 2019-08-09 NOTE — ASSESSMENT & PLAN NOTE
· Patient has been chest pain-free prior to admission denies any chest pain at this time  · Troponin levels- 0 17, 0 27, 0 50  · Cardiology input appreciated  · Echocardiogram shows LVEF of 60%  There is no regional wall motion abnormalities  · EKG with no ST elevations noted  · Patient with normal stress test done in April 2019  · Cardiology does not recommend cardiac cath at this time   · Continue with asa, plavix and statin   · Patient would like to follow up with Dr Vitor Pabon as he has not been able to get an appointment with his current cardiology

## 2019-08-09 NOTE — SOCIAL WORK
Visited patient in his hospital room to initiate discharge planning  Patient lives with his daughter Jose F Dawson in a 2 story home with bathrooms on both floors, 1 step to enter  Patient independent  Drives, employed  His family md is Dr Rena Singleton  Uses [facility] for meds and denies issues obtaining them  Patient made aware of observation status and pamphlet given  Denies questions re: same  Patient/caregiver received discharge checklist   Content reviewed  Patient/caregiver encouraged to participate in discharge plan of care prior to discharge home

## 2019-08-09 NOTE — ASSESSMENT & PLAN NOTE
Stenting in 2011  Recent nuclear stress test was negative for ischemia    Echo 4-9-18 shows normal EF without WMA   Repeat echocardiogram if not change in EF or WMA   Patient will be D/C to see his [facility] cardiologist soon and to have further OP w/u  In addition patient is advised to get rest/sleep prior to returning to work as an [employer]-  Given current symptoms, elevation in troponins and EKG findings --If there is any change in echocardiographic findings or WMA   Recommendation for cardiac catheterization

## 2019-08-09 NOTE — ASSESSMENT & PLAN NOTE
· Unclear etiology   · This is resolved   · CT head- no acute process   · Patient refused closed MRI even with option of ativan  I discussed this in details with patient that he will need to follow up with his PCP and obtain script for an opened MRI

## 2019-08-09 NOTE — CONSULTS
Consultation - Nephrology   Pablo Gutierrez 75 y o  male MRN: [id number]  Unit/Bed#: -01 Encounter: [id number]      A/P:  1  Acute kidney injury: unsure whether acute or chronic  He has a history of a right nephrectomy for RCCA and has a baseline creatinine of 1 37 one year ago  Will check a left kidney ultrasound and urine studies    Check a PVR and avoid nephrotoxic medications  He will need outpatient followup           Thank you for allowing us to participate in the care of your patient  Please feel free to contact us regarding the care of this patient, or any other questions/concerns that may be applicable  Patient Active Problem List   Diagnosis    HLD (hyperlipidemia)    Coronary artery disease involving native coronary artery of native heart    Renal cell carcinoma, right (HCC)    Elevated troponin    Acute renal failure superimposed on stage 3 chronic kidney disease ([facility] [address] )       History of Present Illness   Physician Requesting Consult: Jodi Vail MD  Reason for Consult / Principal Problem: acute kidney injury  Hx and PE limited by:   HPI: Pablo Gutierrez is a 75y o  year old male who presents with acute kidney injury  He has a history of chronic kidney disease stage 3A with a GFR of 54 and creatinine of 1 37 He was admitted for monitoring due to dizziness and an elevated troponin level  He denies any GI loss  He has had a right nephrectomy due to renal cell cancer and apparently has a left renal mass which will be evaluated  2  Dizziness: may be related to cardiac status  Cardiology following  3  BPH: on doxazosin and finasteride  Check a PSA    History obtained from chart review and the patient    Review of Systems - Negative except as mentioned above in HPI, more specifics as mentioned below  Review of Systems - Negative except 10 point review of systems negative - has no dizziness this morning    Historical Information   History reviewed   No pertinent past medical history  History reviewed  No pertinent surgical history  Social History   Social History     Substance and Sexual Activity   Alcohol Use Not Currently     Social History     Substance and Sexual Activity   Drug Use Not Currently     Social History     Tobacco Use   Smoking Status Never Smoker   Smokeless Tobacco Never Used     History reviewed  No pertinent family history  Meds/Allergies   all current active meds have been reviewed, current meds:   Current Facility-Administered Medications   Medication Dose Route Frequency    acetaminophen (TYLENOL) tablet 650 mg  650 mg Oral Q6H PRN    aspirin (ECOTRIN LOW STRENGTH) EC tablet 81 mg  81 mg Oral Daily    atorvastatin (LIPITOR) tablet 40 mg  40 mg Oral HS    clopidogrel (PLAVIX) tablet 75 mg  75 mg Oral Daily    doxazosin (CARDURA) tablet 4 mg  4 mg Oral Daily    finasteride (PROSCAR) tablet 5 mg  5 mg Oral Daily    heparin (porcine) subcutaneous injection 5,000 Units  5,000 Units Subcutaneous Q8H [address]    ondansetron (ZOFRAN) injection 4 mg  4 mg Intravenous Q6H PRN    oxyCODONE (ROXICODONE) immediate release tablet 30 mg  30 mg Oral Q6H PRN    and PTA meds:    Medications Prior to Admission   Medication    ASPIRIN 81 PO    atorvastatin (LIPITOR) 40 mg tablet    clopidogrel (PLAVIX) 75 mg tablet    doxazosin (CARDURA) 4 mg tablet    finasteride (PROSCAR) 5 mg tablet    oxyCODONE (ROXICODONE) 30 MG immediate release tablet         No Known Allergies    Objective     Intake/Output Summary (Last 24 hours) at 8/9/2019 1154  Last data filed at 8/9/2019 0501  Gross per 24 hour   Intake 360 ml   Output 1000 ml   Net -640 ml       Invasive Devices:        Physical Exam      I/O last 3 completed shifts:   In: 360 [P O :360]  Out: 1000 [Urine:1000]    Vitals:    08/09/19 1138   BP: 135/72   Pulse: 69   Resp: 18   Temp: 98 9 °F (37 2 °C)   SpO2: 97%       Gen: in NAD, Alert/Awake  HEENT: no sclerous icterus, MMM, neck supple  CV: +S1/S2, RRR  Lungs: CTA bilaterally  Abd: +BS, soft NT/ND  Ext: all four extremities are warm  Skin: no rashes noted  Neuro: CN II-XII intact    Current Weight: Weight - Scale: 93 2 kg (205 lb 8 oz)  First Weight: Weight - Scale: 90 3 kg (199 lb)    Lab Results:  I have personally reviewed pertinent labs  CBC:   Lab Results   Component Value Date    WBC 8 30 08/09/2019    HGB 14 2 08/09/2019    HCT 41 5 (L) 08/09/2019    MCV 93 08/09/2019     (L) 08/09/2019    MCH 31 7 08/09/2019    MCHC 34 3 08/09/2019    RDW 14 3 08/09/2019    MPV 8 8 08/09/2019     CMP:   Lab Results   Component Value Date    K 3 8 08/09/2019     (H) 08/09/2019    CO2 23 08/09/2019    BUN 17 08/09/2019    CREATININE 1 52 (H) 08/09/2019    CALCIUM 8 6 08/09/2019    EGFR 47 08/09/2019     Phosphorus: No results found for: PHOS  Magnesium: No results found for: MG  Urinalysis:   Lab Results   Component Value Date    COLORU Yellow 08/09/2019    CLARITYU Clear 08/09/2019    SPECGRAV 1 015 08/09/2019    PHUR 5 5 08/09/2019    LEUKOCYTESUR Negative 08/09/2019    NITRITE Negative 08/09/2019    GLUCOSEU Negative 08/09/2019    KETONESU Negative 08/09/2019    BILIRUBINUR Negative 08/09/2019    BLOODU Negative 08/09/2019     Ionized Calcium: No results found for: CAION  Coagulation: No results found for: PT, INR, APTT  Troponin:   Lab Results   Component Value Date    TROPONINI 0 50 (H) 08/08/2019     ABG: No results found for: PHART, KTX1IJE, PO2ART, LYX2FCV, K0GGARWX, BEART, SOURCE    Results from last 7 days   Lab Units 08/09/19  0446 08/08/19  1130   POTASSIUM mmol/L 3 8 4 5   CHLORIDE mmol/L 109* 103   CO2 mmol/L 23 24   BUN mg/dL 17 20   CREATININE mg/dL 1 52* 1 63*   CALCIUM mg/dL 8 6 9 5   ALK PHOS U/L  --  58   ALT U/L  --  9   AST U/L  --  12*       Radiology review:  Procedure: Xr Chest 1 View Portable    Result Date: 8/8/2019  Narrative: CHEST INDICATION:   sob   COMPARISON:  None EXAM PERFORMED/VIEWS:  XR CHEST PORTABLE FINDINGS: Cardiomediastinal silhouette appears unremarkable  Patient is rotated  Mild aortic ectasia  Chronic markings  The lungs are clear  No pneumothorax or pleural effusion  Osseous structures appear within normal limits for patient age  Impression: No acute cardiopulmonary disease  Workstation performed: [id number]     Procedure: Ct Head Without Contrast    Result Date: 8/8/2019  Narrative: CT BRAIN - WITHOUT CONTRAST INDICATION:   Dizziness  COMPARISON:  None  TECHNIQUE:  CT examination of the brain was performed  In addition to axial images, coronal 2D reformatted images were created and submitted for interpretation  Radiation dose length product (DLP) for this visit:  952 mGy-cm   This examination, like all CT scans performed in the [facility], was performed utilizing techniques to minimize radiation dose exposure, including the use of iterative reconstruction and automated exposure control  IMAGE QUALITY:  Diagnostic  FINDINGS: PARENCHYMA:  No intracranial mass, mass effect or midline shift  No CT signs of acute infarction  No acute parenchymal hemorrhage  VENTRICLES AND EXTRA-AXIAL SPACES:  Normal for the patient's age  VISUALIZED ORBITS AND PARANASAL SINUSES:  No acute abnormality involving the orbits  Mild scattered sinus mucosal thickening is noted  No fluid levels are seen  CALVARIUM AND EXTRACRANIAL SOFT TISSUES:  Normal      Impression: No acute intracranial abnormality  Mild scattered sinus mucosal thickening is noted  Workstation performed: [id number]         EKG, Pathology, and Other Studies: reviewed      Counseling / Coordination of Care  Total ADDITIONAL floor / unit time spent today 25 minutes  Greater than 50% of total time was spent with the patient and / or family counseling and / or coordination of care   A description of the counseling / coordination of care: follows    Leon Lopes MD

## 2019-08-09 NOTE — NURSING NOTE
Pt discharged to home with daughter and ambulated with staff to car  Discharge instructions reviewed with daughter as well and belongings sent home with patient

## 2019-08-09 NOTE — PLAN OF CARE
Problem: PHYSICAL THERAPY ADULT  Goal: Performs mobility at highest level of function for planned discharge setting  See evaluation for individualized goals  Description  Treatment/Interventions: LE strengthening/ROM, Therapeutic exercise, Endurance training, Bed mobility, Gait training, Spoke to nursing(&neuro re-education w/balance training)    See flowsheet documentation for full assessment, interventions and recommendations  Monclova Abt, PT     Note:   Prognosis: Fair  Problem List: Decreased strength, Decreased endurance, Impaired balance, Decreased mobility, Decreased coordination, Impaired judgement, Decreased safety awareness, Decreased cognition, Pain  Assessment: Pt is 75 y o  male seen for PT evaluation s/p admit to [address] on 8/8/2019 w/ Elevated troponin  PT consulted to assess pt's functional mobility and d/c needs  Order placed for PT eval and tx, w/ activity as tolerated order  Comorbidities affecting pt's physical performance at time of assessment include: weakness, decreased engagement, ASHD, renal cell carcinoma, ARF superimposed on stage 3 CKD  PTA, pt was independent w/ all functional mobility w/ intermittent SPC usage  Personal factors affecting pt at time of IE include: inaccessible home environment, stairs to enter home, inability to navigate community distances, inability to navigate level surfaces w/o external assistance, unable to perform dynamic tasks in community, decreased initiation and engagement, limited insight into impairments and inability to perform IADLs  Please find objective findings from PT assessment regarding body systems outlined above with impairments and limitations including weakness, impaired balance, decreased endurance, gait deviations, pain, decreased safety awareness, impaired judgement, fall risk and decreased cognition  The following objective measures performed on IE also reveal limitations: Barthel Index: 70/100   Pt's clinical presentation is currently unstable/unpredictable  Pt to benefit from continued PT tx to address deficits as defined above and maximize level of functional independent mobility and consistency  From PT/mobility standpoint, recommendation at time of d/c would be anticipate no needs  Recommendation: Home independently     PT - OK to Discharge: Yes(when medically stable)    See flowsheet documentation for full assessment     Tereza Hyde, PT

## 2019-08-09 NOTE — PLAN OF CARE
Problem: OCCUPATIONAL THERAPY ADULT  Goal: Performs self-care activities at highest level of function for planned discharge setting  See evaluation for individualized goals  Description  Treatment Interventions: ADL retraining, Functional transfer training, Cognitive reorientation, Compensatory technique education, Energy conservation          See flowsheet documentation for full assessment, interventions and recommendations  Note:   Limitation: Decreased ADL status, Decreased Safe judgement during ADL, Decreased cognition, Decreased high-level ADLs, Decreased self-care trans     Assessment: Pt is a 75 y o  male who was admitted to [address] [place] [address] on 8/8/2019 with Elevated troponin  At this time, patient is also affected by the comorbidities of: HLD, ASHD, and acute renal failure disease  Additionally, patient is affected by the following personal factors:steps to enter environment, difficulty performing ADLS, difficulty performing IADLS , limited insight into deficits and environment  Orders placed for OT evaluation/treatment with up with assistance orders  Prior to admission, Patient reporting independent with ADLs/IADLs, ambulatory with no AD or a SPC as needed, lives with daughter in a two story house with 1 AUGIE  Upon OT evaluation, patient requires supervision/set-up for UB ADLs and supervision/set-up for LB ADLs  Occupational performance is affected by the following deficits: weakness, decreased balance, impaired sequencing, impaired problem solving, impulsivity and decreased safety awareness  Based on the following information, patient would benefit from continued skilled OT treatment while in the hospital to address deficits and maximize level of functional independence with ADL's and functional mobility  Occupational Performance areas to address include: bathing/shower, toilet hygiene, dressing, medication management, functional mobility, community mobility and household maintenance   From OT standpoint, recommendation at time of d/c would be home with family support       OT Discharge Recommendation: Home with family support  OT - OK to Discharge: Yes(Once medically cleared )     Nicole Riojas, OT

## 2019-08-09 NOTE — DISCHARGE SUMMARY
Discharge- Kathy Salinas 1955, 75 y o  male MRN: [id number]    Unit/Bed#: -01 Encounter: [id number]    Primary Care Provider: No primary care provider on file  Date and time admitted to hospital: 8/8/2019 11:07 AM        * Elevated troponin  Assessment & Plan  · Patient has been chest pain-free prior to admission denies any chest pain at this time  · Troponin levels- 0 17, 0 27, 0 50  · Cardiology input appreciated  · Echocardiogram shows LVEF of 60%  There is no regional wall motion abnormalities  · EKG with no ST elevations noted  · Patient with normal stress test done in April 2019  · Cardiology does not recommend cardiac cath at this time   · Continue with asa, plavix and statin   · Patient would like to follow up with Dr Ayush Olivas as he has not been able to get an appointment with his current cardiology  Dizziness  Assessment & Plan  · Unclear etiology   · This is resolved   · CT head- no acute process   · Patient refused closed MRI even with option of ativan  I discussed this in details with patient that he will need to follow up with his PCP and obtain script for an opened MRI       Acute renal failure superimposed on stage 3 chronic kidney disease (HCC)  Assessment & Plan  · Baseline creatinine around 1 4-1 5 mg/dL   · Avoid any nephrotoxic meds  · Nephrology input appreciate   · Urine studies and US renal  · preliminary US renal result- no hydronephrosis   · Hx of right nephrectomy  · Recommend to follow up with renal outpatient     Renal cell carcinoma, right ([facility] [address] )  Assessment & Plan  · Status post right nephrectomy  · Patient with left renal mass as well  · Continue outpatient follow-up    Coronary artery disease involving native coronary artery of native heart  Assessment & Plan  · Continue home medications  · Cardiology input appreciated  · Please see treatment stated above     HLD (hyperlipidemia)  Assessment & Plan  · Continue statin        Discharging Physician / Practitioner: CHEYENNE De Paz  PCP: No primary care provider on file  Admission Date:   Admission Orders (From admission, onward)     Ordered        08/08/19 1338  Place in Observation (expected length of stay for this patient is less than two midnights)  Once                   Discharge Date: 08/09/19    Resolved Problems  Date Reviewed: 8/9/2019    None          Consultations During Hospital Stay:  · Cardiology   · Nephrology     Procedures Performed:   · None     Significant Findings / Test Results:   · cxr- no acute process   · CT head- no acute process   · ECHO LVEF 60%  No regional wall motion abnormalities  Mild concentric hypertrophy  · US renal - preliminary result shows no hydronephrosis  Incidental Findings:   · None      Test Results Pending at Discharge (will require follow up): · None      Outpatient Tests Requested:  · Follow up with PCP   · Follow up with nephrology   · Follow up with cardiology     Complications:     None     Reason for Admission:  Dizziness  Hospital Course:     Austen Suarez is a 75 y o  male patient who originally presented to the hospital on 8/8/2019 due to dizziness  Please refer to H&P for initial presenting complaint  The patient presented with dizziness while driving to [place] and was feeling dizzy thinking that his sugar might have been low  The symptom recurred on the day of admission the patient presented to the ED  CT head is unremarkable  Dizziness is resolved  He was noted to have elevated troponin  The patient with history of coronary disease status post stent in 2011  Cardiology evaluation was done  Echocardiogram result noted above  There is no regional wall motion abnormalities noted  The patient is asymptomatic  Cardiology recommends to hold off on cardiac catheterization at this time given elevated creatinine absence of angina  Recommended to follow up with his primary cardiologist  Nephrology evaluation was done due to CKD  Urine studies pending  Nephrology recommend following up outpatient to monitor renal function closely  At this time recommended to avoid any nephrotoxins  Of note, the patient has been sleep deprived and has not had a good sleep over the past 24 hours  He is an [employer]   I discussed in detail with him regarding to getting enough sleep prior to returning to work  Please see above list of diagnoses and related plan for additional information  Condition at Discharge: good     Discharge Day Visit / Exam:     Subjective:  Feeling okay  Denies any chest pain or palpitation  Vitals: Blood Pressure: 135/72 (08/09/19 1138)  Pulse: 69 (08/09/19 1138)  Temperature: 98 9 °F (37 2 °C) (08/09/19 1138)  Temp Source: Temporal (08/09/19 1138)  Respirations: 18 (08/09/19 1138)  Height: 5' 10" (177 8 cm) (08/08/19 1401)  Weight - Scale: 93 2 kg (205 lb 8 oz) (08/09/19 0600)  SpO2: 97 % (08/09/19 1138)  Exam:   Physical Exam   Constitutional: He is oriented to person, place, and time  He appears well-developed and well-nourished  No distress  HENT:   Head: Normocephalic and atraumatic  Mouth/Throat: Oropharynx is clear and moist    Eyes: Pupils are equal, round, and reactive to light  Conjunctivae and EOM are normal    Neck: Normal range of motion  Neck supple  No thyromegaly present  Cardiovascular: Normal rate, regular rhythm, normal heart sounds and intact distal pulses  Pulmonary/Chest: Effort normal and breath sounds normal  No respiratory distress  He has no wheezes  Abdominal: Soft  Bowel sounds are normal  He exhibits no distension  There is no tenderness  Musculoskeletal: Normal range of motion  He exhibits no edema or deformity  Neurological: He is alert and oriented to person, place, and time  He has normal reflexes  No cranial nerve deficit  Skin: Skin is warm and dry  No erythema  Psychiatric: He has a normal mood and affect   His behavior is normal  Thought content normal    Vitals reviewed  Discussion with Family: daughter     Discharge instructions/Information to patient and family:   See after visit summary for information provided to patient and family  Provisions for Follow-Up Care:  See after visit summary for information related to follow-up care and any pertinent home health orders  Disposition:     Home    For Discharges to [facility] SNF:   · Not Applicable to this Patient - Not Applicable to this Patient    Planned Readmission:    No      Discharge Statement:  I spent 32 minutes discharging the patient  This time was spent on the day of discharge  I had direct contact with the patient on the day of discharge  Greater than 50% of the total time was spent examining patient, answering all patient questions, arranging and discussing plan of care with patient as well as directly providing post-discharge instructions  Additional time then spent on discharge activities  Discharge Medications:  See after visit summary for reconciled discharge medications provided to patient and family        ** Please Note: This note has been constructed using a voice recognition system **

## 2019-08-09 NOTE — UTILIZATION REVIEW
Initial Clinical Review    Admission: Date/Time/Statement: 8/8/19 @ [address] This Encounter   Procedures    Place in Observation (expected length of stay for this patient is less than two midnights)     Standing Status:   Standing     Number of Occurrences:   1     Order Specific Question:   Admitting Physician     Answer:   [place]     Order Specific Question:   Level of Care     Answer:   Med Surg [16]     ED Arrival Information     Expected Arrival Acuity Means of Arrival Escorted By Service Admission Type    - 8/8/2019 10:42 Emergent Walk-In Self General Medicine Emergency    Arrival Complaint    weakness        Chief Complaint   Patient presents with    Dizziness     Assessment/Plan: 76 y/o male presents to ED from home with dizziness described as room spinning, started last evening still present this morning  Worse with turning head and ambulation  Admitted as observation due to elevated troponin, MYRA  Tele  Trend troponins  Cardiology consult  Echo  No ST elevation noted on EKG  Baseline Cr around 1 4  Nephrology consult  Hx R nephrectomy  8/9 Cardiology consult:  Echo reveals normal LV function without wall motion abnormalities  Recommend outpatient follow up with usual cardiologist   8/9 Nephrology consult:  MYRA unsure whether acute or chronic  Hx R nephrectomy for RCCA and baseline Cr of 1 37 over a year ago  Renal US and urine studies        ED Triage Vitals [08/08/19 1111]   Temperature Pulse Respirations Blood Pressure SpO2   99 1 °F (37 3 °C) (!) 106 18 114/63 96 %      Temp Source Heart Rate Source Patient Position - Orthostatic VS BP Location FiO2 (%)   Temporal Monitor Sitting Left arm --      Pain Score       No Pain        Wt Readings from Last 1 Encounters:   08/09/19 93 2 kg (205 lb 8 oz)     Additional Vital Signs:   08/09/19 0707 99 °F (37 2 °C) 67 18 121/68 93 % None (Room air)   08/09/19 0342 97 2 °F (36 2 °C) 63 18 109/58 92 % None (Room air)   08/08/19 2347 97 7 °F (36 5 °C) 66 18 106/63 91 % None (Room air)   08/08/19 1926 99 1 °F (37 3 °C) 89 20 110/58 95 % None (Room air)   08/08/19 1401 99 9 °F (37 7 °C) 94 22 106/60 94 % None (Room air)   08/08/19 1315  95  100/60 95 %        Pertinent Labs/Diagnostic Test Results:   Results from last 7 days   Lab Units 08/09/19  0446 08/08/19  1130   WBC Thousand/uL 8 30 17 20*   HEMOGLOBIN g/dL 14 2 16 1   HEMATOCRIT % 41 5* 45 4   PLATELETS Thousands/uL 140* 172   NEUTROS ABS Thousands/µL 4 40 15 00*     Results from last 7 days   Lab Units 08/09/19  0446 08/08/19  1130   SODIUM mmol/L 139 135   POTASSIUM mmol/L 3 8 4 5   CHLORIDE mmol/L 109* 103   CO2 mmol/L 23 24   ANION GAP mmol/L 7 8   BUN mg/dL 17 20   CREATININE mg/dL 1 52* 1 63*   EGFR ml/min/1 73sq m 47 43   CALCIUM mg/dL 8 6 9 5     Results from last 7 days   Lab Units 08/08/19  1130   AST U/L 12*   ALT U/L 9   ALK PHOS U/L 58   TOTAL PROTEIN g/dL 6 8   ALBUMIN g/dL 4 2   TOTAL BILIRUBIN mg/dL 0 60     Results from last 7 days   Lab Units 08/09/19  0446 08/08/19  1130   GLUCOSE RANDOM mg/dL 101* 139*     Results from last 7 days   Lab Units 08/08/19  2030 08/08/19  1430 08/08/19  1130   TROPONIN I ng/mL 0 50* 0 27* 0 17*     Results from last 7 days   Lab Units 08/08/19  1130   PROTIME seconds 12 6   INR  1 09   PTT seconds 32     Results from last 7 days   Lab Units 08/08/19  1130   BNP pg/mL 112*     Results from last 7 days   Lab Units 08/09/19  0533   CLARITY UA  Clear   COLOR UA  Yellow   SPEC GRAV UA  1 015   PH UA  5 5   GLUCOSE UA mg/dl Negative   KETONES UA mg/dl Negative   BLOOD UA  Negative   PROTEIN UA mg/dl Negative   NITRITE UA  Negative   BILIRUBIN UA  Negative   UROBILINOGEN UA E U /dl 0 2   LEUKOCYTES UA  Negative     8/8 EKG:  Normal sinus rhythm  Incomplete right bundle branch block  Cannot rule out prior  Septal infarct , age undetermined    8/8 CXR:  No acute cardiopulmonary disease      8/8 CT head:  No acute intracranial abnormality  Mild scattered sinus mucosal thickening is noted       8/9 Echo:  LEFT VENTRICLE:  Systolic function was normal  Ejection fraction was estimated to be 60 %  There were no regional wall motion abnormalities  There was mild concentric hypertrophy  Renal US pending    ED Treatment:   Medication Administration from 08/08/2019 1042 to 08/08/2019 1355       Date/Time Order Dose Route Action     08/08/2019 1132 sodium chloride 0 9 % bolus 1,000 mL 1,000 mL Intravenous New Bag     08/08/2019 1157 meclizine (ANTIVERT) tablet 25 mg 25 mg Oral Given     08/08/2019 1313 aspirin tablet 325 mg 325 mg Oral Given        History reviewed  No pertinent past medical history  Present on Admission:   ASHD (arteriosclerotic heart disease)   HLD (hyperlipidemia)   Renal cell carcinoma, right (HCC)      Admitting Diagnosis: Dizziness [R42]  Leukocytosis [D72 829]  CKD (chronic kidney disease) [N18 9]  Elevated troponin [R74 8]  Age/Sex: 75 y o  male  Admission Orders:    Current Facility-Administered Medications:  acetaminophen 650 mg Oral Q6H PRN   aspirin 81 mg Oral Daily   atorvastatin 40 mg Oral HS   clopidogrel 75 mg Oral Daily   doxazosin 4 mg Oral Daily   finasteride 5 mg Oral Daily   heparin (porcine) 5,000 Units Subcutaneous Q8H [address]   ondansetron 4 mg Intravenous Q6H PRN   oxyCODONE 30 mg Oral Q6H PRN x2       IP CONSULT TO CARDIOLOGY  IP CONSULT TO NEPHROLOGY   VS  Tele  MRI brain  Echo  PT/OT    Network Utilization Review Department  Phone: [phone]; Fax [phone]  [email or web address]  ATTENTION: Please call with any questions or concerns to [phone]  and carefully listen to the prompts so that you are directed to the right person  Send all requests for admission clinical reviews, approved or denied determinations and any other requests to fax [phone]   All voicemails are confidential

## 2019-08-09 NOTE — ASSESSMENT & PLAN NOTE
· Baseline creatinine around 1 4-1 5 mg/dL   · Avoid any nephrotoxic meds  · Nephrology input appreciate   · Urine studies and US renal  · preliminary US renal result- no hydronephrosis   · Hx of right nephrectomy  · Recommend to follow up with renal outpatient

## 2019-08-12 NOTE — UTILIZATION REVIEW
Notification of Discharge  This is a Notification of Discharge from our facility [address]  Please be advised that this patient has been discharge from our facility  Below you will find the admission and discharge date and time including the patients disposition  PRESENTATION DATE: 8/8/2019 11:07 AM  OBS ADMISSION DATE:   IP ADMISSION DATE: N/A N/A   DISCHARGE DATE: 8/9/2019  5:57 PM  DISPOSITION: Home/Self Care Home/Self Care   Admission Orders listed below:  Admission Orders (From admission, onward)     Ordered        08/08/19 1338  Place in Observation (expected length of stay for this patient is less than two midnights)  Once                   Please contact the UR Department if additional information is required to close this patient's authorization/case  [address]  Utilization Review Department  Phone: [phone]; Fax [phone]  [email or web address]  ATTENTION: Please call with any questions or concerns to [phone]  and carefully listen to the prompts so that you are directed to the right person  Send all requests for admission clinical reviews, approved or denied determinations and any other requests to fax [phone]   All voicemails are confidential

## 2019-09-09 ENCOUNTER — OFFICE VISIT (OUTPATIENT)
Dept: CARDIOLOGY CLINIC | Facility: CLINIC | Age: 64
End: 2019-09-09
Payer: COMMERCIAL

## 2019-09-09 VITALS
HEART RATE: 60 BPM | SYSTOLIC BLOOD PRESSURE: 120 MMHG | BODY MASS INDEX: 28.77 KG/M2 | HEIGHT: 70 IN | DIASTOLIC BLOOD PRESSURE: 70 MMHG | WEIGHT: 201 LBS

## 2019-09-09 DIAGNOSIS — R77.8 ELEVATED TROPONIN: ICD-10-CM

## 2019-09-09 DIAGNOSIS — E78.5 HYPERLIPIDEMIA, UNSPECIFIED HYPERLIPIDEMIA TYPE: ICD-10-CM

## 2019-09-09 DIAGNOSIS — I25.10 CORONARY ARTERY DISEASE INVOLVING NATIVE CORONARY ARTERY OF NATIVE HEART WITHOUT ANGINA PECTORIS: Primary | ICD-10-CM

## 2019-09-09 PROCEDURE — 99204 OFFICE O/P NEW MOD 45 MIN: CPT | Performed by: INTERNAL MEDICINE

## 2019-09-09 RX ORDER — ALPRAZOLAM 2 MG/1
2 TABLET ORAL
COMMUNITY
Start: 2019-08-16

## 2019-09-09 RX ORDER — VARENICLINE TARTRATE 25 MG
KIT ORAL
Qty: 53 TABLET | Refills: 0 | Status: SHIPPED | OUTPATIENT
Start: 2019-09-09

## 2019-09-09 RX ORDER — CELECOXIB 200 MG/1
200 CAPSULE ORAL 2 TIMES DAILY
COMMUNITY
Start: 2019-08-16

## 2019-09-09 RX ORDER — POLYETHYLENE GLYCOL 3350 17 G
2 POWDER IN PACKET (EA) ORAL AS NEEDED
Qty: 100 EACH | Refills: 0 | Status: SHIPPED | OUTPATIENT
Start: 2019-09-09

## 2019-09-09 NOTE — PROGRESS NOTES
[facility] [place]  [facility] [address] [phone]                                              Cardiology Office Consult  Alber Oakley, 75 y o  male  YOB: 1955  MRN: [id number] Encounter: [id number]      PCP - Eliseo Asher    Assessment and Plan  1  CAD, s/p stent (2011)  · No records available  · ECHO - 08/09/2019 - LVEF 60%, no regional wall motion abnormalities, no significant valvular abnormalities, mild-LVH  2  Elevated troponin  3  Active smoker  · Makes his own cigarettes at home  4  Hypertension  5  Hyperlipidemia  6  H/o renal cell carcinoma s/p right nephrectomy  7  Claustrophobia    Plan  CAD s/p stent (2011), recently elevated troponin  · Had mild troponin elevation with up trend till 0 5  · This was in the setting of overt ago, and in the absence of any chest pain or shortness of breath  · He does have CKD which could be contributing to slight troponin elevations, but the up trend in his troponins cannot be explained clearly  · Recent nuclear stress test done in April showed of fixed defect in the inferior inferoseptal wall, which mostly resolved with prone imaging and was thought to be an attenuation artifact  · He does have multiple risk factors for coronary artery disease  · We will repeat an exercise stress echo to further clarify the same    · He remains on aspirin, despite being several years out of his stenting  · We will try and obtain the records regarding his stenting  · If stress testing is normal, then we will plan on discontinuing his Plavix    Active smoker  · Extensively counseled regarding need to quit smoking and explained the risks of coronary artery disease, stroke and various cancers associated with smoking  · He understands and is a consider quitting  · He notes that he previously tried quitting over a decade ago, but was not successful  · He wants to try Chantix, and has trouble with chewing, and as a result prefers nicotine lozenges be up  · Have prescribed the same for him    Hyperlipidemia  · On Atorvastatin 40 mg  · Lipids, last checked in December 2017, total cholesterol 82, HDL 46, LDL 18    Hypertension  · Blood pressure 120/70  · Has been well controlled previously  · On Doxazosin 4 mg    History of Present Illness   75year-old gentleman comes in as a new patient after recent hospitalization, where he had elevated troponins  About 1 month ago, he presented to [facility] hospital with complaints of dizziness he was riding in a car when all of a sudden he noted head spinning sensation  Was taken to the ED for evaluation, but knows abnormalities were found  He notes that there was a problem with the exhaust on his car as well as some other mechanical problems the car, which could have significantly contributed to his vertigo  While he was hospitalized, he had troponins checked, and they were noted to be elevated  His troponins continued to trend up to 0 50  He did not have any symptoms of chest pain, shortness of breath or other ECG abnormalities at this time  Cardiology was consulted and they noted his recent normal nuclear stress test, and as a result he was recommended outpatient follow-up with Cardiology  He has not had any further symptoms of dizziness or vertigo since discharge, and has otherwise been doing well  He is very active, and 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  He otherwise does heavy lifting insignificant walking without any symptoms of chest pain  Just yesterday, he moved furniture for somebody with the help of another friend  And this included lifting heavy come out changes, and he was able to do the same without any problems  He is chronic smoker, and makes his own cigarettes at home after purchasing tobacco and paper for the cigarettes  He drinks occasional alcohol      Historical Information   Past Medical History: Diagnosis Date    ASHD (arteriosclerotic heart disease)     Left renal mass     Mixed hyperlipidemia     Presence of stent in coronary artery     Renal cell carcinoma, right (HCC)     Smoker      Past Surgical History:   Procedure Laterality Date    CORONARY STENT PLACEMENT      In [facility]    FRACTURE SURGERY      NEPHRECTOMY RADICAL      SHOULDER ARTHROSCOPY      TONSILLECTOMY       Family History   Problem Relation Age of Onset    Coronary artery disease Father      Current Outpatient Medications on File Prior to Visit   Medication Sig Dispense Refill    ALPRAZolam (XANAX) 2 MG tablet Take 2 mg by mouth daily at bedtime as needed       ASPIRIN 81 PO Take 81 mg by mouth daily      atorvastatin (LIPITOR) 40 mg tablet Take 40 mg by mouth daily at bedtime      celecoxib (CeleBREX) 200 mg capsule Take 200 mg by mouth 2 (two) times a day       clopidogrel (PLAVIX) 75 mg tablet Take 75 mg by mouth daily      doxazosin (CARDURA) 4 mg tablet Take 4 mg by mouth daily       finasteride (PROSCAR) 5 mg tablet Take 5 mg by mouth daily      oxyCODONE (ROXICODONE) 30 MG immediate release tablet Take 30 mg by mouth 4 (four) times a day       No current facility-administered medications on file prior to visit  No Known Allergies  Social History     Socioeconomic History    Marital status:       Spouse name: None    Number of children: None    Years of education: None    Highest education level: None   Occupational History    None   Social Needs    Financial resource strain: None    Food insecurity:     Worry: None     Inability: None    Transportation needs:     Medical: None     Non-medical: None   Tobacco Use    Smoking status: Current Every Day Smoker     Packs/day: 1 00     Years: 46 00     Pack years: 46 00    Smokeless tobacco: Never Used   Substance and Sexual Activity    Alcohol use: Not Currently    Drug use: Not Currently    Sexual activity: None   Lifestyle    Physical activity:     Days per week: None     Minutes per session: None    Stress: None   Relationships    Social connections:     Talks on phone: None     Gets together: None     Attends [employer] service: None     Active member of club or organization: None     Attends meetings of clubs or organizations: None     Relationship status: None    Intimate partner violence:     Fear of current or ex partner: None     Emotionally abused: None     Physically abused: None     Forced sexual activity: None   Other Topics Concern    None   Social History Narrative    None        Review of Systems  No c/o chest pain, No c/o palpitations, No c/o shortness of breath, No c/o dizziness or light-headedness   All other systems negative, except as noted in HPI    Vitals:  Vitals:    09/09/19 1326   BP: 120/70   Pulse: 60   Weight: 91 2 kg (201 lb)   Height: 5' 10" (1 778 m)     BMI - Body mass index is 28 84 kg/m²  Wt Readings from Last 7 Encounters:   09/09/19 91 2 kg (201 lb)   08/09/19 93 2 kg (205 lb 8 oz)   08/08/19 93 3 kg (205 lb 9 6 oz)       Physical Exam    Vitals reviewed  Nursing note & chart reviewed  Constitutional:  Alice Lawton appears at well, pleasant and cooperative  Alert and oriented x 3  No acute distress   HEENT:    Normocephalic, atraumatic   Neck:  Supple, no JVD, negative AJR   Lungs:  Respirations unlabored, clear to auscultation bilaterally, no rales, no wheezing  Chest Wall:  No tenderness, no pertinent deformity   Heart[de-identified]  Regular rate, Regular rhythm, S1 and S2 normal, no murmurs, no rubs, no gallops   Abdomen:  Soft, non-tender, non-distended   Neurological:  Awake, alert, oriented x3   Non-focal exam    Extremities:  No pedal edema, no calf tenderness       Labs:  CBC:   Lab Results   Component Value Date    WBC 8 30 08/09/2019    RBC 4 48 08/09/2019    HGB 14 2 08/09/2019    HCT 41 5 (L) 08/09/2019    MCV 93 08/09/2019     (L) 08/09/2019    RDW 14 3 08/09/2019       CMP:   Lab Results Component Value Date    K 3 8 2019     (H) 2019    CO2 23 2019    BUN 17 2019    CREATININE 1 52 (H) 2019    EGFR 47 2019    CALCIUM 8 6 2019    AST 12 (L) 2019    ALT 9 2019    ALKPHOS 58 2019       Magnesium:  No results found for: MG    Lipid Profile:   No results found for: CHOL, HDL, TRIG, LDLCALC    Thyroid Studies: No results found for: KKZ6WPRKYUHG, T3FREE, FREET4, Y0BHTTK, B6QIOXE    No components found for: HGA1C    Lab Results   Component Value Date    INR 1 09 2019   5    Imaging: No results found  Cardiac testing:   Results for orders placed during the hospital encounter of 19   Echo complete with contrast if indicated    Narrative [facility] [employer] [address]    Transthoracic Echocardiogram  2D, M-mode, Doppler, and Color Doppler    Study date:  09-Aug-2019    Patient: Jarocho Limon  MR number: [id number]  Account number: [de-identified]  : 1955  Age: 75 years  Gender: Male  Status: Outpatient  Location: [facility]   Height: 70 in  Weight: 209 7 lb  BP: 109/ 58 mmHg    Indications: CAD  Dizziness  Diagnoses: I25 10 - Atherosclerotic heart disease of native coronary artery without angina pectoris, R42  - Dizziness and giddiness    Sonographer:  Alexx Montenegro RDCS  Referring Physician:  Uzair Basilio MD  Group:  [facility]  Interpreting Physician:  Albino Ruiz MD    SUMMARY    LEFT VENTRICLE:  Systolic function was normal  Ejection fraction was estimated to be 60 %  There were no regional wall motion abnormalities  There was mild concentric hypertrophy  HISTORY: Dizziness  PRIOR HISTORY: Risk factors: renal failure and hypercholesterolemia  PROCEDURE: The study was performed in the [facility]  This was a routine study  The transthoracic approach was used   The study included complete 2D imaging, M-mode, complete spectral Doppler, and color Doppler  The  heart rate was 68 bpm, at the start of the study  Images were obtained from the parasternal, apical, subcostal, and suprasternal notch acoustic windows  Echocardiographic views were limited due to poor acoustic window availability and lung  interference  This was a technically difficult study  LEFT VENTRICLE: Size was normal  Systolic function was normal  Ejection fraction was estimated to be 60 %  There were no regional wall motion abnormalities  There was mild concentric hypertrophy  No evidence of apical thrombus  DOPPLER:  Left ventricular diastolic function parameters were normal     RIGHT VENTRICLE: The size was normal  Systolic function was normal  Wall thickness was normal  DOPPLER: Systolic pressure was within the normal range  LEFT ATRIUM: Size was normal  No thrombus was identified  RIGHT ATRIUM: Size was normal     MITRAL VALVE: Valve structure was normal  There was normal leaflet separation  No echocardiographic evidence for prolapse  DOPPLER: The transmitral velocity was within the normal range  There was no evidence for stenosis  There was no  regurgitation  AORTIC VALVE: The valve was trileaflet  Leaflets exhibited normal thickness, mild calcification, and normal cuspal separation  DOPPLER: Transaortic velocity was within the normal range  There was no evidence for stenosis  There was no  regurgitation  TRICUSPID VALVE: The valve structure was normal  There was normal leaflet separation  DOPPLER: The transtricuspid velocity was within the normal range  There was no evidence for stenosis  There was no regurgitation  PULMONIC VALVE: Leaflets exhibited normal thickness, no calcification, and normal cuspal separation  DOPPLER: The transpulmonic velocity was within the normal range  There was no regurgitation  PERICARDIUM: There was no thickening  There was no pericardial effusion   The pericardium was normal in appearance  AORTA: The root exhibited normal size  SYSTEMIC VEINS: IVC: The inferior vena cava was normal in size  PULMONARY ARTERY: The size was normal  The morphology appeared normal     SYSTEM MEASUREMENT TABLES    2D  %FS: 28 51 %  AV Diam: 3 28 cm  Ao asc: 3 33 cm  EDV(Teich): 95 5 ml  EF(Teich): 55 07 %  ESV(Teich): 42 91 ml  IVSd: 1 25 cm  LA Area: 16 66 cm2  LA Diam: 3 63 cm  LVEDV MOD A4C: 147 62 ml  LVEF MOD A4C: 55 99 %  LVESV MOD A4C: 64 97 ml  LVIDd: 4 56 cm  LVIDs: 3 26 cm  LVLd A4C: 9 19 cm  LVLs A4C: 7 23 cm  LVPWd: 1 12 cm  RA Area: 16 92 cm2  RV Diam : 4 05 cm  SI(Teich): 24 69 ml/m2  SV MOD A4C: 82 65 ml  SV(Cube): 60 29 ml  SV(Teich): 52 59 ml    CW  AV Vmax: 1 67 m/s  AV maxP 19 mmHg  TR Vmax: 2 15 m/s  TR maxP 46 mmHg    MM  TAPSE: 2 29 cm    PW  E': 0 12 m/s  E/E': 7 61  LVOT Vmax: 1 35 m/s  LVOT maxP 27 mmHg  MV A [place]: 0 66 m/s  MV Dec [place]: 3 23 m/s2  MV DecT: 281 22 ms  MV E [place]: 0 91 m/s  MV E/A Ratio: 1 39    [facility] Accredited Echocardiography Laboratory    Prepared and electronically signed by    Albino Ruiz MD  Signed 09-Aug-2019 10:55:26       No results found for this or any previous visit  No results found for this or any previous visit  No results found for this or any previous visit

## 2019-09-16 ENCOUNTER — TELEPHONE (OUTPATIENT)
Dept: CARDIOLOGY CLINIC | Facility: CLINIC | Age: 64
End: 2019-09-16

## 2019-09-16 NOTE — TELEPHONE ENCOUNTER
LEVON---Just to let you know that  patient cancelled his Echo stress test you ordered  Per note on cancellation it is because he doesn't want to pay the $250 copay

## 2019-09-17 ENCOUNTER — TELEPHONE (OUTPATIENT)
Dept: CARDIOLOGY CLINIC | Facility: CLINIC | Age: 64
End: 2019-09-17

## 2019-09-17 NOTE — TELEPHONE ENCOUNTER
Spoke to Patient and he is not having any chest pain or SOB  Stated he had a nuclear stress test through [facility] and it was fine  He said he is not paying another $200 for a test, thanked me for trying to help him and hung up

## 2019-09-17 NOTE — TELEPHONE ENCOUNTER
José Luis LUNA  [address]  Male, 75 y o , 1955  MRN:   [id number]  Phone:   [phone] (M)  HCA:   Not Active  PCP:   Brittni Villalpando  Coverage:   Trent Shah REP/ANABEL BLAIR [facility] REP  Next Appt  With Cardiology Rema Tyler MD)  11/12/2019 at 2:40 PM  Good To Go! This visit is ready to be signed  My Open Encounters     You  José Luis Gallagher 2 minutes ago (10:47 AM)      Moisés Zuluaga MD  You 9 hours ago (12:52 AM)      Okay  Reasonable, as long as he is not having any chest pain, or shortness of breath, and continues close office follow up with me at least once more in a month or 2  Thanks  Routing comment       You routed conversation to Moisés Zuluaga MD 19 hours ago (2:58 PM)      Montse Cortes [phone]  You 19 hours ago (2:56 PM)      You 19 hours ago (2:56 PM)         FYI---Just to let you know that  patient cancelled his Echo stress test you ordered  Per note on cancellation it is because he doesn't want to pay the $250 copay             Documentation

## 2023-06-26 ENCOUNTER — HOSPITAL ENCOUNTER (EMERGENCY)
Facility: HOSPITAL | Age: 68
Discharge: HOME | End: 2023-06-26
Attending: EMERGENCY MEDICINE
Payer: COMMERCIAL

## 2023-06-26 VITALS
HEART RATE: 90 BPM | DIASTOLIC BLOOD PRESSURE: 80 MMHG | TEMPERATURE: 98 F | RESPIRATION RATE: 18 BRPM | OXYGEN SATURATION: 98 % | SYSTOLIC BLOOD PRESSURE: 150 MMHG

## 2023-06-26 DIAGNOSIS — L03.90 CELLULITIS: ICD-10-CM
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
## 2023-06-26 RX ORDER — CEPHALEXIN 500 MG/1
500 CAPSULE ORAL EVERY 6 HOURS SCHEDULED
Qty: 28 CAPSULE | Refills: 0 | Status: SHIPPED | OUTPATIENT
Start: 2023-06-26 | End: 2023-07-03

## 2023-06-26 RX ORDER — DOXYCYCLINE HYCLATE 100 MG/1
100 CAPSULE ORAL ONCE
Status: COMPLETED | OUTPATIENT
Start: 2023-06-26 | End: 2023-06-26

## 2023-06-26 RX ORDER — CEPHALEXIN 250 MG/1
500 CAPSULE ORAL ONCE
Status: COMPLETED | OUTPATIENT
Start: 2023-06-26 | End: 2023-06-26

## 2023-06-26 RX ORDER — DOXYCYCLINE HYCLATE 100 MG/1
100 CAPSULE ORAL 2 TIMES DAILY
Qty: 20 CAPSULE | Refills: 0 | Status: SHIPPED | OUTPATIENT
Start: 2023-06-26 | End: 2023-07-06

## 2023-06-26 RX ADMIN — DOXYCYCLINE HYCLATE 100 MG: 100 CAPSULE ORAL at 15:28

## 2023-06-26 RX ADMIN — CEPHALEXIN 500 MG: 250 CAPSULE ORAL at 15:28

## 2023-06-26 NOTE — ED PROVIDER NOTES
History  Chief Complaint   Patient presents with   • Insect Bite     Bite to L side, now swollen and rash      78 y/o male presents to the ER for evaluation of insect bite  Patient noticed an insect stuck into his left side two days ago  He pulled a small insect out of his side and stated that it was lodged in the skin for an unknown about of time  He stated that his daughter used a tweezers and attempted to see if there is more of the insect in the wound  Patient stated that he woke up this morning with worsening pain and spreading of erythema  The initial bite was in his left lower back and now has spread to his left iliac crest of his left hip  Has mild erythema extending to the left lower flank  No obvious erythema migrans  Patient denies fever, chills, nausea, or vomiting  Non-toxic appearing  History provided by:  Patient  Insect Bite  Associated symptoms: rash    Associated symptoms: no fever and no numbness        Prior to Admission Medications   Prescriptions Last Dose Informant Patient Reported? Taking?    ALPRAZolam (XANAX) 2 MG tablet  Self Yes No   Sig: Take 2 mg by mouth daily at bedtime as needed    ASPIRIN 81 PO  Self Yes No   Sig: Take 81 mg by mouth daily   atorvastatin (LIPITOR) 40 mg tablet  Self Yes No   Sig: Take 40 mg by mouth daily at bedtime   celecoxib (CeleBREX) 200 mg capsule  Self Yes No   Sig: Take 200 mg by mouth 2 (two) times a day    clopidogrel (PLAVIX) 75 mg tablet  Self Yes No   Sig: Take 75 mg by mouth daily   doxazosin (CARDURA) 4 mg tablet  Self Yes No   Sig: Take 4 mg by mouth daily    finasteride (PROSCAR) 5 mg tablet  Self Yes No   Sig: Take 5 mg by mouth daily   nicotine polacrilex (COMMIT) 2 MG lozenge   No No   Sig: Apply 1 lozenge (2 mg total) to the mouth or throat as needed for smoking cessation   oxyCODONE (ROXICODONE) 30 MG immediate release tablet  Self Yes No   Sig: Take 30 mg by mouth 4 (four) times a day   varenicline (CHANTIX SHELLEY) 0 5 MG X 11 & 1 MG X 42 tablet   No No   Sig: Take one 0 5mg tab by mouth 1x daily for 3 days, then increase to one 0 5mg tab 2x daily for 3 days, then increase to one 1mg tab 2x daily      Facility-Administered Medications: None       Past Medical History:   Diagnosis Date   • ASHD (arteriosclerotic heart disease)    • Left renal mass    • Mixed hyperlipidemia    • Presence of stent in coronary artery    • Renal cell carcinoma, right (HCC)    • Smoker        Past Surgical History:   Procedure Laterality Date   • CORONARY STENT PLACEMENT      In [facility]   • FRACTURE SURGERY     • NEPHRECTOMY RADICAL     • SHOULDER ARTHROSCOPY     • TONSILLECTOMY         Family History   Problem Relation Age of Onset   • Coronary artery disease Father      I have reviewed and agree with the history as documented  E-Cigarette/Vaping     E-Cigarette/Vaping Substances     Social History     Tobacco Use   • Smoking status: Every Day     Packs/day: 1 00     Years: 46 00     Total pack years: 46 00     Types: Cigarettes   • Smokeless tobacco: Never   Substance Use Topics   • Alcohol use: Not Currently   • Drug use: Not Currently       Review of Systems   Constitutional: Negative for chills and fever  Respiratory: Negative for cough, shortness of breath and wheezing  Cardiovascular: Negative for chest pain and palpitations  Gastrointestinal: Negative for abdominal pain, diarrhea, nausea and vomiting  Genitourinary: Negative for dysuria  Skin: Positive for rash and wound  Neurological: Negative for dizziness, seizures, syncope, weakness and numbness  Physical Exam  Physical Exam  Vitals and nursing note reviewed  Constitutional:       General: He is not in acute distress  Appearance: He is well-developed  HENT:      Head: Normocephalic and atraumatic        Right Ear: External ear normal       Left Ear: External ear normal    Eyes:      Conjunctiva/sclera: Conjunctivae normal    Cardiovascular:      Rate and Rhythm: Normal rate and regular rhythm  Pulses: Normal pulses  Pulmonary:      Effort: Pulmonary effort is normal  No respiratory distress  Abdominal:      Palpations: Abdomen is soft  Tenderness: There is no abdominal tenderness  Musculoskeletal:         General: Normal range of motion  Cervical back: Neck supple  Skin:     General: Skin is warm and dry  Capillary Refill: Capillary refill takes less than 2 seconds  Findings: Erythema present  No bruising or ecchymosis  Comments: Erythema, warm to touch of left side of iliac crest and posterior lower back  is indurated, nontender, there is a central wound to the rash   No fluctuance, no crepitus  Neurological:      Mental Status: He is alert and oriented to person, place, and time  Mental status is at baseline  Psychiatric:         Mood and Affect: Mood normal          Vital Signs  ED Triage Vitals   Temperature Pulse Respirations Blood Pressure SpO2   06/26/23 1418 06/26/23 1418 06/26/23 1418 06/26/23 1418 06/26/23 1418   98 4 °F (36 9 °C) 91 18 164/87 93 %      Temp Source Heart Rate Source Patient Position - Orthostatic VS BP Location FiO2 (%)   06/26/23 1418 06/26/23 1418 06/26/23 1418 06/26/23 1418 --   Temporal Monitor Sitting Left arm       Pain Score       06/26/23 1534       No Pain           Vitals:    06/26/23 1418 06/26/23 1534   BP: 164/87 150/80   Pulse: 91 90   Patient Position - Orthostatic VS: Sitting Sitting         Visual Acuity      ED Medications  Medications   cephalexin (KEFLEX) capsule 500 mg (500 mg Oral Given 6/26/23 1528)   doxycycline hyclate (VIBRAMYCIN) capsule 100 mg (100 mg Oral Given 6/26/23 1528)       Diagnostic Studies  Results Reviewed     None                 No orders to display              Procedures  Procedures         ED Course                               SBIRT 22yo+    Flowsheet Row Most Recent Value   Initial Alcohol Screen: US AUDIT-C     1   How often do you have a drink containing alcohol? 0 Filed at: 06/26/2023 1421   2  How many drinks containing alcohol do you have on a typical day you are drinking? 0 Filed at: 06/26/2023 1421   3a  Male UNDER 65: How often do you have five or more drinks on one occasion? 0 Filed at: 06/26/2023 1421   3b  FEMALE Any Age, or MALE 65+: How often do you have 4 or more drinks on one occassion? 0 Filed at: 06/26/2023 1421   Audit-C Score 0 Filed at: 06/26/2023 1421   LEONILA: How many times in the past year have you    Used an illegal drug or used a prescription medication for non-medical reasons? Never Filed at: 06/26/2023 1421                    Medical Decision Making  This patient presents with initial presentation of local erythema, warmth, swelling concerning for cellulitis, Lyme possibility  Sensitivity/pain to light touch around the erythematous area  No lymphangitic spread visible and no fluid pockets or fluctuance concerning for abscess noted low concern for osteomyelitis  No immune compromise, bullae, pain out of proportion, rapid progression concerning for necrotizing fasciitis  Patient to be discharged home with Keflex and doxycycline  Advised to follow-up with primary care provider  Patient is nontoxic and well-appearing  Advised patient to return to the ER if symptoms of infection worsen or questions or concerns arise at home  Patient verbalized understanding and is in agreement  Risk  Prescription drug management  Disposition  Final diagnoses:   Insect bite   Cellulitis     Time reflects when diagnosis was documented in both MDM as applicable and the Disposition within this note     Time User Action Codes Description Comment    6/26/2023  3:11 PM Francie Burtose  XXXA] Insect bite     6/26/2023  3:11 PM Francie Mosley Add [R10 [id number]] Cellulitis       ED Disposition     ED Disposition   Discharge    Condition   Stable    Date/Time   Mon Jun 26, 2023  3:11 PM    Comment   Kartik Carmona discharge to home/self care  Follow-up Information     Follow up With Specialties Details Why Contact Info Additional [address] MD Internal Medicine, Geriatric Medicine   [facility] [address] [facility] Emergency Department Emergency Medicine  If symptoms worsen [address] Emergency Department, [address] [place], [place], [address]          Discharge Medication List as of 6/26/2023  3:14 PM      START taking these medications    Details   cephalexin (KEFLEX) 500 mg capsule Take 1 capsule (500 mg total) by mouth every 6 (six) hours for 7 days, Starting Mon 6/26/2023, Until Mon 7/3/2023, Normal      doxycycline hyclate (VIBRAMYCIN) 100 mg capsule Take 1 capsule (100 mg total) by mouth 2 (two) times a day for 10 days, Starting Mon 6/26/2023, Until Thu 7/6/2023, Normal         CONTINUE these medications which have NOT CHANGED    Details   ALPRAZolam (XANAX) 2 MG tablet Take 2 mg by mouth daily at bedtime as needed , Starting Fri 8/16/2019, Historical Med      ASPIRIN 81 PO Take 81 mg by mouth daily, Historical Med      atorvastatin (LIPITOR) 40 mg tablet Take 40 mg by mouth daily at bedtime, Starting Mon 3/6/2017, Historical Med      celecoxib (CeleBREX) 200 mg capsule Take 200 mg by mouth 2 (two) times a day , Starting Fri 8/16/2019, Historical Med      clopidogrel (PLAVIX) 75 mg tablet Take 75 mg by mouth daily, Starting Thu 3/2/2017, Historical Med      doxazosin (CARDURA) 4 mg tablet Take 4 mg by mouth daily , Starting Thu 3/2/2017, Historical Med      finasteride (PROSCAR) 5 mg tablet Take 5 mg by mouth daily, Starting Thu 3/2/2017, Historical Med      nicotine polacrilex (COMMIT) 2 MG lozenge Apply 1 lozenge (2 mg total) to the mouth or throat as needed for smoking cessation, Starting Mon 9/9/2019, Normal      oxyCODONE (ROXICODONE) 30 MG immediate release tablet Take 30 mg by mouth 4 (four) times a day, Starting Tue 4/4/2017, Historical Med      varenicline (CHANTIX SHELLEY) 0 5 MG X 11 & 1 MG X 42 tablet Take one 0 5mg tab by mouth 1x daily for 3 days, then increase to one 0 5mg tab 2x daily for 3 days, then increase to one 1mg tab 2x daily, Normal             No discharge procedures on file      PDMP Review     None          ED Provider  Electronically Signed by           Shayy Kerr  06/26/23 2745

## 2023-06-26 NOTE — DISCHARGE INSTRUCTIONS
Take medicine with food  Wear sunscreen when outdoors  Apply ice to wound  Follow up with primary care provider  Return to the ER if symptoms of infection get worse: fever, chills, nausea, vomiting, diarrhea, abdominal pain

## 2023-06-26 NOTE — ED ATTENDING ATTESTATION
6/26/2023  Luis XIE DO, saw and evaluated the patient  I have discussed the patient with the resident/non-physician practitioner and agree with the resident's/non-physician practitioner's findings, Plan of Care, and MDM as documented in the resident's/non-physician practitioner's note, except where noted  All available labs and Radiology studies were reviewed  I was present for key portions of any procedure(s) performed by the resident/non-physician practitioner and I was immediately available to provide assistance  At this point I agree with the current assessment done in the Emergency Department  I have conducted an independent evaluation of this patient a history and physical is as follows:      78 y/o M p/w left sided erythema, secondary to bug bite, that he noticed 2 days ago  He pulled the bug off  States it was lodged into his side  Has erythema  No fevers or chills  He had some difficulty removing the bone, then his daughter also probed the wound with tweezers  Since then the redness has spread  The initial bite is in his left lower back, and it spreads to his left lower flank  States it is uncomfortable, no fevers or chills, no pain  No nausea or vomiting  Patient has a history of renal cell carcinoma in remission  Erythematous circular rash over left lower back, is indurated, nontender, there is a central wound to the rash that is likely the area of the bug bite  No fluctuance, no crepitus  Has mild erythema extending to the left lower flank  No obvious erythema migrans  Patient does not appear toxic, vitals are reassuring, no fevers  Will start on antibiotics with concern for cellulitis, Lyme is a possibility  Patient uncertain if it was a tick or not  Started on Keflex and doxycycline  Advised PCP follow-up, return cautionfrances calles, patient verbalized understanding and is in agreement with plan      ED Course         Critical Care Time  Procedures

## 2023-07-20 ENCOUNTER — APPOINTMENT (OUTPATIENT)
Dept: PHYSICAL THERAPY | Facility: CLINIC | Age: 68
End: 2023-07-20
Payer: COMMERCIAL

## 2023-07-26 ENCOUNTER — APPOINTMENT (OUTPATIENT)
Dept: PHYSICAL THERAPY | Facility: CLINIC | Age: 68
End: 2023-07-26
Payer: COMMERCIAL

## 2023-08-02 ENCOUNTER — OFFICE VISIT (OUTPATIENT)
Dept: PHYSICAL THERAPY | Facility: CLINIC | Age: 68
End: 2023-08-02
Payer: COMMERCIAL

## 2023-08-02 DIAGNOSIS — M25.512 LEFT SHOULDER PAIN, UNSPECIFIED CHRONICITY: Primary | ICD-10-CM

## 2023-08-02 DIAGNOSIS — M25.511 RIGHT SHOULDER PAIN, UNSPECIFIED CHRONICITY: ICD-10-CM

## 2023-08-02 PROCEDURE — 97535 SELF CARE MNGMENT TRAINING: CPT

## 2023-08-02 PROCEDURE — 97162 PT EVAL MOD COMPLEX 30 MIN: CPT
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
## 2023-08-08 ENCOUNTER — HOSPITAL ENCOUNTER (EMERGENCY)
Facility: HOSPITAL | Age: 68
Discharge: HOME | End: 2023-08-08
Attending: EMERGENCY MEDICINE
Payer: COMMERCIAL

## 2023-08-08 ENCOUNTER — APPOINTMENT (OUTPATIENT)
Dept: PHYSICAL THERAPY | Facility: CLINIC | Age: 68
End: 2023-08-08
Payer: COMMERCIAL

## 2023-08-08 VITALS
OXYGEN SATURATION: 97 % | SYSTOLIC BLOOD PRESSURE: 179 MMHG | TEMPERATURE: 98.7 F | HEART RATE: 58 BPM | DIASTOLIC BLOOD PRESSURE: 89 MMHG | RESPIRATION RATE: 14 BRPM

## 2023-08-08 DIAGNOSIS — Z51.89 ENCOUNTER FOR WOUND RE-CHECK: Primary | ICD-10-CM

## 2023-08-08 PROCEDURE — NC001 PR NO CHARGE: Performed by: PHYSICIAN ASSISTANT

## 2023-08-08 PROCEDURE — 99282 EMERGENCY DEPT VISIT SF MDM: CPT

## 2023-08-08 PROCEDURE — 99283 EMERGENCY DEPT VISIT LOW MDM: CPT | Performed by: PHYSICIAN ASSISTANT

## 2023-08-09 NOTE — ED PROVIDER NOTES
History  Chief Complaint   Patient presents with   • Tick Bite     Seen here recently for same, sent home with antibiotics. Patient reports finishing antibiotics but presents with raised red and black scab approximately dime sized to their left low back at site of original bite. Patient reports "it's not going away and it's itchy". 25-year-old male here for wound recheck. He was bitten by a tick about 2 months ago. States since then he has had an area of swelling on his lower back. Has not gone completely away. He completed course of antibiotics. Occasionally has some mild discomfort and itchiness but no fevers chills rash or redness. Prior to Admission Medications   Prescriptions Last Dose Informant Patient Reported? Taking?    ALPRAZolam (XANAX) 2 MG tablet  Self Yes No   Sig: Take 2 mg by mouth daily at bedtime as needed    ASPIRIN 81 PO  Self Yes No   Sig: Take 81 mg by mouth daily   atorvastatin (LIPITOR) 40 mg tablet  Self Yes No   Sig: Take 40 mg by mouth daily at bedtime   celecoxib (CeleBREX) 200 mg capsule  Self Yes No   Sig: Take 200 mg by mouth 2 (two) times a day    clopidogrel (PLAVIX) 75 mg tablet  Self Yes No   Sig: Take 75 mg by mouth daily   doxazosin (CARDURA) 4 mg tablet  Self Yes No   Sig: Take 4 mg by mouth daily    finasteride (PROSCAR) 5 mg tablet  Self Yes No   Sig: Take 5 mg by mouth daily   nicotine polacrilex (COMMIT) 2 MG lozenge   No No   Sig: Apply 1 lozenge (2 mg total) to the mouth or throat as needed for smoking cessation   oxyCODONE (ROXICODONE) 30 MG immediate release tablet  Self Yes No   Sig: Take 30 mg by mouth 4 (four) times a day   varenicline (CHANTIX SHELLEY) 0.5 MG X 11 & 1 MG X 42 tablet   No No   Sig: Take one 0.5mg tab by mouth 1x daily for 3 days, then increase to one 0.5mg tab 2x daily for 3 days, then increase to one 1mg tab 2x daily      Facility-Administered Medications: None       Past Medical History:   Diagnosis Date   • ASHD (arteriosclerotic heart disease)    • Left renal mass    • Mixed hyperlipidemia    • Presence of stent in coronary artery    • Renal cell carcinoma, right (HCC)    • Smoker        Past Surgical History:   Procedure Laterality Date   • CORONARY STENT PLACEMENT      In [facility]   • FRACTURE SURGERY     • NEPHRECTOMY RADICAL     • ORIF TIBIA & FIBULA FRACTURES     • SHOULDER ARTHROSCOPY     • TONSILLECTOMY         Family History   Problem Relation Age of Onset   • Coronary artery disease Father      I have reviewed and agree with the history as documented. E-Cigarette/Vaping   • E-Cigarette Use Never User      E-Cigarette/Vaping Substances     Social History     Tobacco Use   • Smoking status: Every Day     Packs/day: 1.00     Years: 46.00     Total pack years: 46.00     Types: Cigarettes   • Smokeless tobacco: Never   Vaping Use   • Vaping Use: Never used   Substance Use Topics   • Alcohol use: Not Currently   • Drug use: Not Currently       Review of Systems   Constitutional: Negative for chills and fever. HENT: Negative for ear pain and sore throat. Eyes: Negative for pain and visual disturbance. Respiratory: Negative for cough and shortness of breath. Cardiovascular: Negative for chest pain and palpitations. Gastrointestinal: Negative for abdominal pain and vomiting. Genitourinary: Negative for dysuria and hematuria. Musculoskeletal: Negative for arthralgias and back pain. Skin: Negative for color change and rash. Neurological: Negative for seizures and syncope. All other systems reviewed and are negative. Physical Exam  Physical Exam  Vitals and nursing note reviewed. Constitutional:       General: He is not in acute distress. Appearance: He is well-developed. HENT:      Head: Normocephalic and atraumatic. Eyes:      Conjunctiva/sclera: Conjunctivae normal.   Cardiovascular:      Rate and Rhythm: Normal rate and regular rhythm. Heart sounds: No murmur heard.   Pulmonary:      Effort: Pulmonary effort is normal. No respiratory distress. Breath sounds: Normal breath sounds. Abdominal:      Palpations: Abdomen is soft. Tenderness: There is no abdominal tenderness. Musculoskeletal:         General: No swelling. Cervical back: Neck supple. Skin:     General: Skin is warm and dry. Capillary Refill: Capillary refill takes less than 2 seconds. Neurological:      Mental Status: He is alert. Psychiatric:         Mood and Affect: Mood normal.         Vital Signs  ED Triage Vitals [08/08/23 2011]   Temperature Pulse Respirations Blood Pressure SpO2   98.7 °F (37.1 °C) 58 14 (!) 179/89 97 %      Temp Source Heart Rate Source Patient Position - Orthostatic VS BP Location FiO2 (%)   Temporal Monitor Sitting Left arm --      Pain Score       --           Vitals:    08/08/23 2011   BP: (!) 179/89   Pulse: 58   Patient Position - Orthostatic VS: Sitting         Visual Acuity      ED Medications  Medications - No data to display    Diagnostic Studies  Results Reviewed     None                 No orders to display              Procedures  Procedures         ED Course                                             Medical Decision Making  Differential diagnosis includes but is not limited to nonhealing wound, scar tissue, lipoma, doubt cellulitis. Plan/MDM. Can follow-up with general surgery. There is no area of fluctuance or induration to suggest cellulitis or abscess at this time. Defer any attempted I&D. Discussed return parameters. Patient understands and agrees with plan.         Disposition  Final diagnoses:   Encounter for wound re-check     Time reflects when diagnosis was documented in both MDM as applicable and the Disposition within this note     Time User Action Codes Description Comment    8/8/2023  8:20 PM Garry Cavanaugh Add [Z51.89] Encounter for wound re-check       ED Disposition     ED Disposition   Discharge    Condition   Stable    Date/Time   Tue Aug 8, 2023  8:20 PM    [address] [facility] discharge to home/self care.                Follow-up Information    None         Patient's Medications   Discharge Prescriptions    No medications on file           PDMP Review     None          ED Provider  Electronically Signed by           Bartolo Altamirano PA-C  08/08/23 2024

## 2023-09-08 ENCOUNTER — OFFICE VISIT (OUTPATIENT)
Dept: SURGERY | Facility: CLINIC | Age: 68
End: 2023-09-08
Payer: COMMERCIAL

## 2023-09-08 VITALS
HEIGHT: 70 IN | DIASTOLIC BLOOD PRESSURE: 72 MMHG | WEIGHT: 213 LBS | RESPIRATION RATE: 18 BRPM | OXYGEN SATURATION: 96 % | SYSTOLIC BLOOD PRESSURE: 120 MMHG | HEART RATE: 73 BPM | TEMPERATURE: 97.9 F | BODY MASS INDEX: 30.49 KG/M2

## 2023-09-08 DIAGNOSIS — Z51.89 ENCOUNTER FOR WOUND RE-CHECK: ICD-10-CM

## 2023-09-08 DIAGNOSIS — L98.9 SKIN LESION: Primary | ICD-10-CM

## 2023-09-08 PROCEDURE — 99203 OFFICE O/P NEW LOW 30 MIN: CPT | Performed by: SURGERY

## 2023-09-08 NOTE — PROGRESS NOTES
Assessment/Plan:       1. Skin lesion  -     Case request operating room: EXCISIONAL DEBRIDEMENT left buttock; Standing  -     Case request operating room: EXCISIONAL DEBRIDEMENT left buttock    2. Encounter for wound re-check  -     Ambulatory Referral to General Surgery        Risks of excisional debridement not limited to bleeding, infection, recurrence, damage to surrounding structures discussed. Consent signed. Subjective:      Patient ID: Keren Feng is a 77 y.o. male. Triage Notes:    Ms Santino Nieves is presenting with a left buttock lesion. It is waxing and waning in size. Very itchy. Uncomfortable but not painful. No current drainage. Denies fevers/chills. The following portions of the patient's history were reviewed and updated as appropriate:   He  has a past medical history of ASHD (arteriosclerotic heart disease), Left renal mass, Mixed hyperlipidemia, Presence of stent in coronary artery, Renal cell carcinoma, right ([address]), and Smoker. He   Patient Active Problem List    Diagnosis Date Noted    Dizziness 08/09/2019    Elevated troponin 08/08/2019    Acute renal failure superimposed on stage 3 chronic kidney disease ([address]) 08/08/2019    Renal cell carcinoma, right ([address]) 08/23/2017    Coronary artery disease involving native coronary artery of native heart 05/23/2017    HLD (hyperlipidemia) 01/17/2014     He  has a past surgical history that includes Coronary stent placement; Fracture surgery; Nephrectomy radical; Shoulder arthroscopy; Tonsillectomy; ORIF tibia & fibula fractures; Colonoscopy; and [place] tooth extraction. His family history includes Coronary artery disease in his father. He  reports that he has been smoking cigarettes. He has a 46.00 pack-year smoking history. He has never used smokeless tobacco. He reports that he does not currently use alcohol. He reports that he does not currently use drugs.   Current Outpatient Medications on File Prior to Visit   Medication Sig    ALPRAZolam (XANAX) 2 MG tablet Take 2 mg by mouth daily at bedtime as needed     ASPIRIN 81 PO Take 81 mg by mouth daily    atorvastatin (LIPITOR) 40 mg tablet Take 40 mg by mouth daily at bedtime    celecoxib (CeleBREX) 200 mg capsule Take 200 mg by mouth 2 (two) times a day     clopidogrel (PLAVIX) 75 mg tablet Take 75 mg by mouth daily    doxazosin (CARDURA) 4 mg tablet Take 4 mg by mouth daily     finasteride (PROSCAR) 5 mg tablet Take 5 mg by mouth daily    nicotine polacrilex (COMMIT) 2 MG lozenge Apply 1 lozenge (2 mg total) to the mouth or throat as needed for smoking cessation    oxyCODONE (ROXICODONE) 30 MG immediate release tablet Take 30 mg by mouth 4 (four) times a day    varenicline (CHANTIX SHELLEY) 0.5 MG X 11 & 1 MG X 42 tablet Take one 0.5mg tab by mouth 1x daily for 3 days, then increase to one 0.5mg tab 2x daily for 3 days, then increase to one 1mg tab 2x daily     No current facility-administered medications on file prior to visit. He has No Known Allergies. .    Review of Systems   Constitutional:  Negative for appetite change, chills, fever and unexpected weight change. HENT:  Negative for hearing loss, sore throat, trouble swallowing and voice change. Eyes:  Negative for visual disturbance. Respiratory:  Negative for cough, chest tightness, shortness of breath and wheezing. Cardiovascular:  Negative for chest pain and palpitations. Gastrointestinal:  Negative for abdominal distention and blood in stool. Endocrine: Negative for cold intolerance and heat intolerance. Genitourinary:  Negative for difficulty urinating. Skin:  Positive for color change (of the lesion only). Negative for rash. Neurological:  Negative for dizziness, speech difficulty, light-headedness and numbness. Hematological:  Does not bruise/bleed easily. Psychiatric/Behavioral:  Negative for confusion, hallucinations and suicidal ideas.           Objective:      /72 (BP Location: Left arm, Patient Position: Sitting, Cuff Size: Standard)   Pulse 73   Temp 97.9 °F (36.6 °C)   Resp 18   Ht 5' 10" (1.778 m)   Wt 96.6 kg (213 lb)   SpO2 96%   BMI 30.56 kg/m²     Below is the patient's most recent value for Albumin, ALT, AST, BUN, Calcium, Chloride, Cholesterol, CO2, Creatinine, GFR, Glucose, HDL, Hematocrit, Hemoglobin, Hemoglobin A1C, LDL, Magnesium, Phosphorus, Platelets, Potassium, PSA, Sodium, Triglycerides, and WBC. Lab Results   Component Value Date    ALT 9 08/08/2019    AST 12 (L) 08/08/2019    BUN 17 08/09/2019    CALCIUM 8.6 08/09/2019     (H) 08/09/2019    CO2 23 08/09/2019    CREATININE 1.52 (H) 08/09/2019    HCT 41.5 (L) 08/09/2019    HGB 14.2 08/09/2019     (L) 08/09/2019    K 3.8 08/09/2019    WBC 8.30 08/09/2019     Note: for a comprehensive list of the patient's lab results, access the Results Review activity. Physical Exam  Vitals and nursing note reviewed. Constitutional:       General: He is not in acute distress. Appearance: He is well-developed. HENT:      Head: Normocephalic and atraumatic. Eyes:      General:         Right eye: No discharge. Left eye: No discharge. Neck:      Vascular: No JVD. Cardiovascular:      Rate and Rhythm: Normal rate and regular rhythm. Pulmonary:      Effort: Pulmonary effort is normal.      Breath sounds: Normal breath sounds. Abdominal:      General: There is no distension. Palpations: Abdomen is soft. Tenderness: There is no abdominal tenderness. There is no guarding. Musculoskeletal:         General: Normal range of motion. Cervical back: Normal range of motion and neck supple. Skin:     General: Skin is warm and dry. Comments: Upper left buttock there is about a 1cm round raised epidermal/dermal lesion. No erythema. +hyperpigmentation. No drainage. Neurological:      Mental Status: He is alert and oriented to person, place, and time.    Psychiatric:         Mood and Affect: Mood normal.         Behavior: Behavior normal.         Thought Content:  Thought content normal.         Judgment: Judgment normal.             Procedures

## 2023-10-11 ENCOUNTER — HOSPITAL ENCOUNTER (OUTPATIENT)
Facility: HOSPITAL | Age: 68
Setting detail: OUTPATIENT SURGERY
Discharge: HOME | End: 2023-10-11
Attending: SURGERY | Admitting: SURGERY
Payer: COMMERCIAL

## 2023-10-11 ENCOUNTER — ANESTHESIA EVENT (OUTPATIENT)
Dept: PERIOP | Facility: HOSPITAL | Age: 68
End: 2023-10-11
Payer: COMMERCIAL

## 2023-10-11 ENCOUNTER — ANESTHESIA (OUTPATIENT)
Dept: PERIOP | Facility: HOSPITAL | Age: 68
End: 2023-10-11
Payer: COMMERCIAL

## 2023-10-11 VITALS
DIASTOLIC BLOOD PRESSURE: 92 MMHG | HEIGHT: 70 IN | BODY MASS INDEX: 31.59 KG/M2 | WEIGHT: 220.68 LBS | TEMPERATURE: 97.7 F | RESPIRATION RATE: 20 BRPM | HEART RATE: 65 BPM | SYSTOLIC BLOOD PRESSURE: 163 MMHG | OXYGEN SATURATION: 96 %

## 2023-10-11 DIAGNOSIS — L98.9 SKIN LESION: ICD-10-CM

## 2023-10-11 PROCEDURE — NC001 PR NO CHARGE: Performed by: SURGERY

## 2023-10-11 PROCEDURE — 88305 TISSUE EXAM BY PATHOLOGIST: CPT | Performed by: DERMATOLOGY

## 2023-10-11 PROCEDURE — 88312 SPECIAL STAINS GROUP 1: CPT | Performed by: DERMATOLOGY

## 2023-10-11 PROCEDURE — 11402 EXC TR-EXT B9+MARG 1.1-2 CM: CPT | Performed by: SURGERY

## 2023-10-11 RX ORDER — MAGNESIUM HYDROXIDE 1200 MG/15ML
LIQUID ORAL AS NEEDED
Status: DISCONTINUED | OUTPATIENT
Start: 2023-10-11 | End: 2023-10-11 | Stop reason: HOSPADM

## 2023-10-11 RX ORDER — BUPIVACAINE HYDROCHLORIDE 2.5 MG/ML
INJECTION, SOLUTION EPIDURAL; INFILTRATION; INTRACAUDAL AS NEEDED
Status: DISCONTINUED | OUTPATIENT
Start: 2023-10-11 | End: 2023-10-11 | Stop reason: HOSPADM

## 2023-10-11 RX ORDER — ACETAMINOPHEN 325 MG/1
975 TABLET ORAL EVERY 8 HOURS PRN
Status: DISCONTINUED | OUTPATIENT
Start: 2023-10-11 | End: 2023-10-11 | Stop reason: HOSPADM

## 2023-10-11 NOTE — PROGRESS NOTES
Assessment/Plan:       1. Skin lesion  -     Case request operating room: EXCISIONAL DEBRIDEMENT left buttock; Standing  -     Case request operating room: EXCISIONAL DEBRIDEMENT left buttock    2. Encounter for wound re-check  -     Ambulatory Referral to General Surgery        Risks of excisional debridement not limited to bleeding, infection, recurrence, damage to surrounding structures discussed. Consent signed. Subjective:      Patient ID: Martín Sanford is a 77 y.o. male. Triage Notes:    Ms Kandis Carr is presenting with a left buttock lesion. It is waxing and waning in size. Very itchy. Uncomfortable but not painful. No current drainage. Denies fevers/chills. The following portions of the patient's history were reviewed and updated as appropriate:   He  has a past medical history of ASHD (arteriosclerotic heart disease), Left renal mass, Mixed hyperlipidemia, Presence of stent in coronary artery, Renal cell carcinoma, right ([address]), and Smoker. He   Patient Active Problem List    Diagnosis Date Noted    Dizziness 08/09/2019    Elevated troponin 08/08/2019    Acute renal failure superimposed on stage 3 chronic kidney disease ([address]) 08/08/2019    Renal cell carcinoma, right ([address]) 08/23/2017    Coronary artery disease involving native coronary artery of native heart 05/23/2017    HLD (hyperlipidemia) 01/17/2014     He  has a past surgical history that includes Coronary stent placement; Fracture surgery; Nephrectomy radical; Shoulder arthroscopy; Tonsillectomy; ORIF tibia & fibula fractures; Colonoscopy; and [place] tooth extraction. His family history includes Coronary artery disease in his father. He  reports that he has been smoking cigarettes. He has a 46.00 pack-year smoking history. He has never used smokeless tobacco. He reports that he does not currently use alcohol. He reports that he does not currently use drugs.   Current Outpatient Medications on File Prior to Visit   Medication Sig    ALPRAZolam (XANAX) 2 MG tablet Take 2 mg by mouth daily at bedtime as needed     ASPIRIN 81 PO Take 81 mg by mouth daily    atorvastatin (LIPITOR) 40 mg tablet Take 40 mg by mouth daily at bedtime    celecoxib (CeleBREX) 200 mg capsule Take 200 mg by mouth 2 (two) times a day     clopidogrel (PLAVIX) 75 mg tablet Take 75 mg by mouth daily    doxazosin (CARDURA) 4 mg tablet Take 4 mg by mouth daily     finasteride (PROSCAR) 5 mg tablet Take 5 mg by mouth daily    nicotine polacrilex (COMMIT) 2 MG lozenge Apply 1 lozenge (2 mg total) to the mouth or throat as needed for smoking cessation    oxyCODONE (ROXICODONE) 30 MG immediate release tablet Take 30 mg by mouth 4 (four) times a day    varenicline (CHANTIX SHELLEY) 0.5 MG X 11 & 1 MG X 42 tablet Take one 0.5mg tab by mouth 1x daily for 3 days, then increase to one 0.5mg tab 2x daily for 3 days, then increase to one 1mg tab 2x daily     No current facility-administered medications on file prior to visit. He has No Known Allergies. .    Review of Systems   Constitutional:  Negative for appetite change, chills, fever and unexpected weight change. HENT:  Negative for hearing loss, sore throat, trouble swallowing and voice change. Eyes:  Negative for visual disturbance. Respiratory:  Negative for cough, chest tightness, shortness of breath and wheezing. Cardiovascular:  Negative for chest pain and palpitations. Gastrointestinal:  Negative for abdominal distention and blood in stool. Endocrine: Negative for cold intolerance and heat intolerance. Genitourinary:  Negative for difficulty urinating. Skin:  Positive for color change (of the lesion only). Negative for rash. Neurological:  Negative for dizziness, speech difficulty, light-headedness and numbness. Hematological:  Does not bruise/bleed easily. Psychiatric/Behavioral:  Negative for confusion, hallucinations and suicidal ideas.           Objective:      /72 (BP Location: Left arm, Patient Position: Sitting, Cuff Size: Standard)   Pulse 73   Temp 97.9 °F (36.6 °C)   Resp 18   Ht 5' 10" (1.778 m)   Wt 96.6 kg (213 lb)   SpO2 96%   BMI 30.56 kg/m²     Below is the patient's most recent value for Albumin, ALT, AST, BUN, Calcium, Chloride, Cholesterol, CO2, Creatinine, GFR, Glucose, HDL, Hematocrit, Hemoglobin, Hemoglobin A1C, LDL, Magnesium, Phosphorus, Platelets, Potassium, PSA, Sodium, Triglycerides, and WBC. Lab Results   Component Value Date    ALT 9 08/08/2019    AST 12 (L) 08/08/2019    BUN 17 08/09/2019    CALCIUM 8.6 08/09/2019     (H) 08/09/2019    CO2 23 08/09/2019    CREATININE 1.52 (H) 08/09/2019    HCT 41.5 (L) 08/09/2019    HGB 14.2 08/09/2019     (L) 08/09/2019    K 3.8 08/09/2019    WBC 8.30 08/09/2019     Note: for a comprehensive list of the patient's lab results, access the Results Review activity. Physical Exam  Vitals and nursing note reviewed. Constitutional:       General: He is not in acute distress. Appearance: He is well-developed. HENT:      Head: Normocephalic and atraumatic. Eyes:      General:         Right eye: No discharge. Left eye: No discharge. Neck:      Vascular: No JVD. Cardiovascular:      Rate and Rhythm: Normal rate and regular rhythm. Pulmonary:      Effort: Pulmonary effort is normal.      Breath sounds: Normal breath sounds. Abdominal:      General: There is no distension. Palpations: Abdomen is soft. Tenderness: There is no abdominal tenderness. There is no guarding. Musculoskeletal:         General: Normal range of motion. Cervical back: Normal range of motion and neck supple. Skin:     General: Skin is warm and dry. Comments: Upper left buttock there is about a 1cm round raised epidermal/dermal lesion. No erythema. +hyperpigmentation. No drainage. Neurological:      Mental Status: He is alert and oriented to person, place, and time.    Psychiatric:         Mood and Affect: Mood normal.         Behavior: Behavior normal.         Thought Content:  Thought content normal.         Judgment: Judgment normal.             Procedures

## 2023-10-11 NOTE — QUICK NOTE
At the conclusion of the local procedure I informed the patient that he can take tylenol if needed for pain/discomfort. He became upset saying that I am insulting him for suggesting that because tylenol is not a pain medication. And that if I wanted to give him pain medicine it should be oxycodone 30. I told him for this small lesion and local excision we will not be prescribing him oxycodone. He continued to argue to which I informed him he can take tylenol over the counter or choose to not take it and ended the discussion.  (He has CKD and should not take motrin.)

## 2023-10-16 NOTE — OP NOTE
OPERATIVE REPORT  PATIENT NAME: Jen Gallardo    :  1955  MRN: [id number]  Pt Location: [facility] OR ROOM 02    SURGERY DATE: 10/11/2023    Surgeon(s) and Role:     * Mona Mondragon MD - Primary    Preop Diagnosis:  Skin lesion [L98.9]    Post-Op Diagnosis Codes:     * Skin lesion [L98.9]    Procedure(s):  Left - EXCISIONAL DEBRIDEMENT left buttock    Specimen(s):  ID Type Source Tests Collected by Time Destination   1 : Left buttock lesion Tissue Buttock TISSUE EXAM Mona Mondragon MD 10/11/2023 [id number]        Estimated Blood Loss:   Minimal    Drains:  none    Anesthesia Type:   Local    Operative Indications:  Skin lesion [L98.9]      Operative Findings:  Hyperpigmentation and induration of the epidermis/dermis. Otherwise unremarkable subcutaneous layer. Complications:   None    Procedure and Technique:    The patient was seen in preop holding where the location of the lesion was confirmed and marked. The H&P was updated and the procedure reviewed with the patient. The patient was then brought to the OR and placed in lateral position. The patient was not sedated as he requested. The site was prepped and draped in sterile fashion. A preincision time out was initiated by myself and confirmed the patient, procedure, site/side and any applicable marking as well as preoperative antibiotics. The skin and subcutaneous tissue was then anesthetized with 1% lidocaine and 0.25% marcaine mixed 1:1. A 15 blade was used to make a 2x1 cm elliptical incision. This was carried down through the dermis and the lesion was encountered. The bovey was used to disect the lesion free from the surrounding dermis and subcutaneous tissue. The bovey was used to achieve hemostasis. The lesion appeared to be about 2x1 cm in size and consistent with a cyst and was passed off as specimen. The wound was then irrigated and reapproximated using 3-0 vicryl. The wound was then covered with steri strips, guaze and tegaderm.       I was present for the entire procedure. and A qualified resident physician was not available.     Patient Disposition:  APU and hemodynamically stable        SIGNATURE: Miguel Patel MD  DATE: October 16, 2023  TIME: 2:25 PM

## 2023-10-25 PROCEDURE — 88312 SPECIAL STAINS GROUP 1: CPT | Performed by: DERMATOLOGY

## 2023-10-25 PROCEDURE — 88305 TISSUE EXAM BY PATHOLOGIST: CPT | Performed by: DERMATOLOGY

## 2023-11-21 ENCOUNTER — OFFICE VISIT (OUTPATIENT)
Dept: SURGERY | Facility: CLINIC | Age: 68
End: 2023-11-21
Payer: COMMERCIAL

## 2023-11-21 VITALS
WEIGHT: 220 LBS | HEART RATE: 70 BPM | DIASTOLIC BLOOD PRESSURE: 68 MMHG | OXYGEN SATURATION: 97 % | SYSTOLIC BLOOD PRESSURE: 118 MMHG | BODY MASS INDEX: 31.5 KG/M2 | RESPIRATION RATE: 18 BRPM | HEIGHT: 70 IN | TEMPERATURE: 97.5 F

## 2023-11-21 DIAGNOSIS — Z51.89 ENCOUNTER FOR WOUND RE-CHECK: Primary | ICD-10-CM

## 2023-11-21 PROCEDURE — 99213 OFFICE O/P EST LOW 20 MIN: CPT | Performed by: SURGERY

## 2023-11-21 RX ORDER — OXYBUTYNIN CHLORIDE 5 MG/1
TABLET, EXTENDED RELEASE ORAL
COMMUNITY
Start: 2023-10-09

## 2023-11-21 NOTE — PROGRESS NOTES
Post-Op Follow Up- General Surgery   Dale Morales 77 y.o. male MRN: [id number]  Unit/Bed#:  Encounter: [id number]    Assessment/Plan     Assessment:  Status post excision of skin lesion from lower back, now he has a wound dehiscence  Plan:  The patient was advised to continue with daily dressing changes and follow-up in our office in 2 weeks. The patient was given specific instructions how to take care of the wound. History of Present Illness     HPI:  Dale Morales is a 77 y.o. male who presents to my office for first postop follow-up after excision of skin lesion from the lower back from October 11 by Dr. Anushka Dasilva. Patient has surgery on October 11 and he was doing well, he missed his postop appointment and he presented today to the office complaining that the wound has opened up since yesterday. He denies having any drainage, pain, redness or any other constitutional symptoms. He mention of having itching from the wound. Patient is 1 pack a day smoker. Historical Information   Past Medical History:   Diagnosis Date    ASHD (arteriosclerotic heart disease)     Left renal mass     Mixed hyperlipidemia     Presence of stent in coronary artery     Renal cell carcinoma, right (HCC)     Smoker      Past Surgical History:   Procedure Laterality Date    COLONOSCOPY      CORONARY STENT PLACEMENT      In 2016 [facility] ARTHROSCOPY      TONSILLECTOMY      WISDOM TOOTH EXTRACTION      WOUND DEBRIDEMENT Left 10/11/2023    Procedure: EXCISIONAL DEBRIDEMENT left buttock;   Surgeon: Ashly Carrizales MD;  [facility];  Service: General     Social History   Social History     Substance and Sexual Activity   Alcohol Use Not Currently     Social History     Substance and Sexual Activity   Drug Use Not Currently     Social History     Tobacco Use   Smoking Status Every Day    Packs/day: 1.00    Years: 46.00 Total pack years: 46.00    Types: Cigarettes    Passive exposure: Past   Smokeless Tobacco Never     Family History: non-contributory    Meds/Allergies   all medications and allergies reviewed     Current Outpatient Medications:     ALPRAZolam (XANAX) 2 MG tablet, Take 2 mg by mouth daily at bedtime as needed , Disp: , Rfl:     ASPIRIN 81 PO, Take 81 mg by mouth daily, Disp: , Rfl:     atorvastatin (LIPITOR) 40 mg tablet, Take 40 mg by mouth daily at bedtime, Disp: , Rfl:     celecoxib (CeleBREX) 200 mg capsule, Take 200 mg by mouth 2 (two) times a day , Disp: , Rfl:     clopidogrel (PLAVIX) 75 mg tablet, Take 75 mg by mouth daily, Disp: , Rfl:     doxazosin (CARDURA) 4 mg tablet, Take 4 mg by mouth daily , Disp: , Rfl:     finasteride (PROSCAR) 5 mg tablet, Take 5 mg by mouth daily, Disp: , Rfl:     oxybutynin (DITROPAN-XL) 5 mg 24 hr tablet, , Disp: , Rfl:     oxyCODONE (ROXICODONE) 30 MG immediate release tablet, Take 30 mg by mouth 4 (four) times a day, Disp: , Rfl:   No Known Allergies    Objective     Current Vitals:   Blood Pressure: 118/68 (11/21/23 1321)  Pulse: 70 (11/21/23 1321)  Temperature: 97.5 °F (36.4 °C) (11/21/23 1321)  Respirations: 18 (11/21/23 1321)  Height: 5' 10" (177.8 cm) (11/21/23 1321)  Weight - Scale: 99.8 kg (220 lb) (11/21/23 1321)  SpO2: 97 % (11/21/23 1321)    Physical Exam  Vitals and nursing note reviewed. Constitutional:       General: He is not in acute distress. Cardiovascular:      Rate and Rhythm: Normal rate and regular rhythm. Pulmonary:      Effort: No respiratory distress. Breath sounds: Normal breath sounds. Abdominal:      Palpations: Abdomen is soft. There is no mass. Tenderness: There is no abdominal tenderness. Comments: Wound from the lower back is approximately 2.5 cm in the largest diameter and shallow, good granulation tissue without evidence of infection. Skin:     General: Skin is warm. Coloration: Skin is not jaundiced. Findings: No erythema or rash. Comments: Wound from the lower back is approximately 2.5 cm in the largest diameter and shallow, good granulation tissue without evidence of infection. Neurological:      Mental Status: He is alert and oriented to person, place, and time. Cranial Nerves: No cranial nerve deficit.    Psychiatric:         Mood and Affect: Mood normal.         Behavior: Behavior normal.

## 2023-12-01 ENCOUNTER — TELEPHONE (OUTPATIENT)
Dept: SURGERY | Facility: CLINIC | Age: 68
End: 2023-12-01

## 2023-12-01 NOTE — TELEPHONE ENCOUNTER
I called patient to confirm his appointment for 12/5/23 with Jeff Vaughan and remind him that he has a copayment that is due upon arrival.   Patient was upset that he had a copayment and believed that this follow up should be covered from his insurance without having to pay. The patient expressed that he believed we were trying to take advantage and "samara him" , I assured him  that we were not and advised he should contact his insurance company directly so that he can hear from them directly about his plan and what is and is not covered. I ended the call after confirming the appointment.

## 2023-12-05 ENCOUNTER — OFFICE VISIT (OUTPATIENT)
Dept: SURGERY | Facility: CLINIC | Age: 68
End: 2023-12-05

## 2023-12-05 VITALS
HEIGHT: 70 IN | OXYGEN SATURATION: 95 % | HEART RATE: 74 BPM | SYSTOLIC BLOOD PRESSURE: 142 MMHG | BODY MASS INDEX: 31.04 KG/M2 | RESPIRATION RATE: 18 BRPM | TEMPERATURE: 97.6 F | WEIGHT: 216.8 LBS | DIASTOLIC BLOOD PRESSURE: 70 MMHG

## 2023-12-05 DIAGNOSIS — Z48.89 POSTOPERATIVE VISIT: Primary | ICD-10-CM

## 2023-12-05 PROCEDURE — 99024 POSTOP FOLLOW-UP VISIT: CPT | Performed by: SURGERY

## 2023-12-05 NOTE — PROGRESS NOTES
Post-Op Follow Up- General Surgery   Kathi Wilson 77 y.o. male MRN: [id number]  Unit/Bed#:  Encounter: [id number]    Assessment/Plan     Assessment:  Status post excision of a skin lesion from lower back, slowly healing  Plan: The wound is healing with dry edges, patient was advised to clean the wound with soap and water on a daily basis, to dry well after that and subsequently to cover the wound. The patient will follow-up on a as needed basis. History of Present Illness     HPI:  Kathi Wilson is a 77 y.o. male who presents to my office for second postop follow-up after excision of a skin lesion from lower back from October 11 by Dr. Diana Mason the patient complains of itching. He stated that he does not cover the wound because it heals better. He denies having any fever, chills or drainage from the wound. Historical Information   Past Medical History:   Diagnosis Date    ASHD (arteriosclerotic heart disease)     Left renal mass     Mixed hyperlipidemia     Presence of stent in coronary artery     Renal cell carcinoma, right (HCC)     Smoker      Past Surgical History:   Procedure Laterality Date    COLONOSCOPY      CORONARY STENT PLACEMENT      In 2016 [facility] ARTHROSCOPY      TONSILLECTOMY      WISDOM TOOTH EXTRACTION      WOUND DEBRIDEMENT Left 10/11/2023    Procedure: EXCISIONAL DEBRIDEMENT left buttock;   Surgeon: Baylee Moser MD;  Location: [facility];  Service: General     Social History   Social History     Substance and Sexual Activity   Alcohol Use Not Currently     Social History     Substance and Sexual Activity   Drug Use Not Currently     Social History     Tobacco Use   Smoking Status Every Day    Packs/day: 1.00    Years: 46.00    Total pack years: 46.00    Types: Cigarettes    Passive exposure: Past   Smokeless Tobacco Never     Family History: non-contributory    Meds/Allergies all medications and allergies reviewed     Current Outpatient Medications:     ALPRAZolam (XANAX) 2 MG tablet, Take 2 mg by mouth daily at bedtime as needed , Disp: , Rfl:     ASPIRIN 81 PO, Take 81 mg by mouth daily, Disp: , Rfl:     atorvastatin (LIPITOR) 40 mg tablet, Take 40 mg by mouth daily at bedtime, Disp: , Rfl:     celecoxib (CeleBREX) 200 mg capsule, Take 200 mg by mouth 2 (two) times a day , Disp: , Rfl:     clopidogrel (PLAVIX) 75 mg tablet, Take 75 mg by mouth daily, Disp: , Rfl:     doxazosin (CARDURA) 4 mg tablet, Take 4 mg by mouth daily , Disp: , Rfl:     finasteride (PROSCAR) 5 mg tablet, Take 5 mg by mouth daily, Disp: , Rfl:     oxybutynin (DITROPAN-XL) 5 mg 24 hr tablet, , Disp: , Rfl:     oxyCODONE (ROXICODONE) 30 MG immediate release tablet, Take 30 mg by mouth 4 (four) times a day, Disp: , Rfl:   No Known Allergies    Objective     Current Vitals:   Blood Pressure: 142/70 (12/05/23 1032)  Pulse: 74 (12/05/23 1032)  Temperature: 97.6 °F (36.4 °C) (12/05/23 1032)  Respirations: 18 (12/05/23 1032)  Height: 5' 10" (177.8 cm) (12/05/23 1032)  Weight - Scale: 98.3 kg (216 lb 12.8 oz) (12/05/23 1032)  SpO2: 95 % (12/05/23 1032)    Physical Exam  Vitals and nursing note reviewed. Skin:     Comments: 1 from the lower back measures approximately 1 cm, very dry changes and base of the wound, it is approximately 2 mm. There is no evidence of infection at this time.

## 2025-01-08 ENCOUNTER — HOSPITAL ENCOUNTER (INPATIENT)
Facility: HOSPITAL | Age: 70
LOS: 1 days | Discharge: LEFT BEFORE DISCHARGE | DRG: 192 | End: 2025-01-08
Attending: EMERGENCY MEDICINE | Admitting: STUDENT IN AN ORGANIZED HEALTH CARE EDUCATION/TRAINING PROGRAM
Payer: MEDICARE

## 2025-01-08 ENCOUNTER — APPOINTMENT (EMERGENCY)
Dept: RADIOLOGY | Facility: HOSPITAL | Age: 70
DRG: 192 | End: 2025-01-08
Payer: MEDICARE

## 2025-01-08 VITALS
DIASTOLIC BLOOD PRESSURE: 70 MMHG | TEMPERATURE: 98.7 F | RESPIRATION RATE: 18 BRPM | OXYGEN SATURATION: 96 % | SYSTOLIC BLOOD PRESSURE: 155 MMHG | HEART RATE: 62 BPM

## 2025-01-08 DIAGNOSIS — R06.00 DYSPNEA: Primary | ICD-10-CM

## 2025-01-08 PROBLEM — J44.1 COPD EXACERBATION (HCC): Status: ACTIVE | Noted: 2025-01-08

## 2025-01-08 LAB
2HR DELTA HS TROPONIN: 0 NG/L
4HR DELTA HS TROPONIN: 0 NG/L
ALBUMIN SERPL BCG-MCNC: 3.7 G/DL (ref 3.5–5)
ALP SERPL-CCNC: 55 U/L (ref 34–104)
ALT SERPL W P-5'-P-CCNC: 7 U/L (ref 7–52)
ANION GAP SERPL CALCULATED.3IONS-SCNC: 7 MMOL/L (ref 4–13)
AST SERPL W P-5'-P-CCNC: 17 U/L (ref 13–39)
ATRIAL RATE: 59 BPM
ATRIAL RATE: 62 BPM
BASE EX.OXY STD BLDV CALC-SCNC: 84 % (ref 60–80)
BASE EXCESS BLDV CALC-SCNC: -0.2 MMOL/L
BASOPHILS # BLD AUTO: 0.03 THOUSANDS/ΜL (ref 0–0.1)
BASOPHILS NFR BLD AUTO: 1 % (ref 0–1)
BILIRUB SERPL-MCNC: 0.55 MG/DL (ref 0.2–1)
BNP SERPL-MCNC: 129 PG/ML (ref 0–100)
BUN SERPL-MCNC: 15 MG/DL (ref 5–25)
CALCIUM SERPL-MCNC: 8.4 MG/DL (ref 8.4–10.2)
CARDIAC TROPONIN I PNL SERPL HS: 7 NG/L (ref ?–50)
CHLORIDE SERPL-SCNC: 103 MMOL/L (ref 96–108)
CO2 SERPL-SCNC: 23 MMOL/L (ref 21–32)
CREAT SERPL-MCNC: 1.06 MG/DL (ref 0.6–1.3)
D DIMER PPP FEU-MCNC: 0.36 UG/ML FEU
EOSINOPHIL # BLD AUTO: 0.22 THOUSAND/ΜL (ref 0–0.61)
EOSINOPHIL NFR BLD AUTO: 5 % (ref 0–6)
ERYTHROCYTE [DISTWIDTH] IN BLOOD BY AUTOMATED COUNT: 13.1 % (ref 11.6–15.1)
FLUAV AG UPPER RESP QL IA.RAPID: NEGATIVE
FLUBV AG UPPER RESP QL IA.RAPID: NEGATIVE
GAS + CO PNL BLDA: 4.2 % (ref 0–1.5)
GFR SERPL CREATININE-BSD FRML MDRD: 70 ML/MIN/1.73SQ M
GLUCOSE SERPL-MCNC: 123 MG/DL (ref 65–140)
HCO3 BLDV-SCNC: 25.2 MMOL/L (ref 24–30)
HCT VFR BLD AUTO: 43.6 % (ref 36.5–49.3)
HGB BLD-MCNC: 14.5 G/DL (ref 12–17)
IMM GRANULOCYTES # BLD AUTO: 0.02 THOUSAND/UL (ref 0–0.2)
IMM GRANULOCYTES NFR BLD AUTO: 1 % (ref 0–2)
LYMPHOCYTES # BLD AUTO: 1.5 THOUSANDS/ΜL (ref 0.6–4.47)
LYMPHOCYTES NFR BLD AUTO: 34 % (ref 14–44)
MCH RBC QN AUTO: 30.1 PG (ref 26.8–34.3)
MCHC RBC AUTO-ENTMCNC: 33.3 G/DL (ref 31.4–37.4)
MCV RBC AUTO: 91 FL (ref 82–98)
MONOCYTES # BLD AUTO: 0.81 THOUSAND/ΜL (ref 0.17–1.22)
MONOCYTES NFR BLD AUTO: 18 % (ref 4–12)
NEUTROPHILS # BLD AUTO: 1.86 THOUSANDS/ΜL (ref 1.85–7.62)
NEUTS SEG NFR BLD AUTO: 41 % (ref 43–75)
NRBC BLD AUTO-RTO: 0 /100 WBCS
O2 CT BLDV-SCNC: 18 ML/DL
P AXIS: 57 DEGREES
P AXIS: 64 DEGREES
PCO2 BLDV: 43.7 MM HG (ref 42–50)
PH BLDV: 7.38 [PH] (ref 7.3–7.4)
PLATELET # BLD AUTO: 129 THOUSANDS/UL (ref 149–390)
PMV BLD AUTO: 10.2 FL (ref 8.9–12.7)
PO2 BLDV: 53 MM HG (ref 35–45)
POTASSIUM SERPL-SCNC: 3.9 MMOL/L (ref 3.5–5.3)
PR INTERVAL: 152 MS
PR INTERVAL: 152 MS
PROT SERPL-MCNC: 6.2 G/DL (ref 6.4–8.4)
QRS AXIS: -11 DEGREES
QRS AXIS: -13 DEGREES
QRSD INTERVAL: 102 MS
QRSD INTERVAL: 106 MS
QT INTERVAL: 410 MS
QT INTERVAL: 416 MS
QTC INTERVAL: 405 MS
QTC INTERVAL: 422 MS
RBC # BLD AUTO: 4.81 MILLION/UL (ref 3.88–5.62)
SARS-COV+SARS-COV-2 AG RESP QL IA.RAPID: NEGATIVE
SODIUM SERPL-SCNC: 133 MMOL/L (ref 135–147)
T WAVE AXIS: 17 DEGREES
T WAVE AXIS: 42 DEGREES
VENTRICULAR RATE: 59 BPM
VENTRICULAR RATE: 62 BPM
WBC # BLD AUTO: 4.44 THOUSAND/UL (ref 4.31–10.16)

## 2025-01-08 PROCEDURE — 99285 EMERGENCY DEPT VISIT HI MDM: CPT | Performed by: EMERGENCY MEDICINE

## 2025-01-08 PROCEDURE — NC001 PR NO CHARGE: Performed by: STUDENT IN AN ORGANIZED HEALTH CARE EDUCATION/TRAINING PROGRAM

## 2025-01-08 PROCEDURE — 87811 SARS-COV-2 COVID19 W/OPTIC: CPT | Performed by: EMERGENCY MEDICINE

## 2025-01-08 PROCEDURE — 80053 COMPREHEN METABOLIC PANEL: CPT | Performed by: EMERGENCY MEDICINE

## 2025-01-08 PROCEDURE — 85025 COMPLETE CBC W/AUTO DIFF WBC: CPT | Performed by: EMERGENCY MEDICINE

## 2025-01-08 PROCEDURE — 94640 AIRWAY INHALATION TREATMENT: CPT

## 2025-01-08 PROCEDURE — 85379 FIBRIN DEGRADATION QUANT: CPT | Performed by: EMERGENCY MEDICINE

## 2025-01-08 PROCEDURE — 84484 ASSAY OF TROPONIN QUANT: CPT | Performed by: EMERGENCY MEDICINE

## 2025-01-08 PROCEDURE — 83880 ASSAY OF NATRIURETIC PEPTIDE: CPT | Performed by: EMERGENCY MEDICINE

## 2025-01-08 PROCEDURE — 93005 ELECTROCARDIOGRAM TRACING: CPT

## 2025-01-08 PROCEDURE — 82375 ASSAY CARBOXYHB QUANT: CPT | Performed by: EMERGENCY MEDICINE

## 2025-01-08 PROCEDURE — 82805 BLOOD GASES W/O2 SATURATION: CPT | Performed by: EMERGENCY MEDICINE

## 2025-01-08 PROCEDURE — 36415 COLL VENOUS BLD VENIPUNCTURE: CPT | Performed by: EMERGENCY MEDICINE

## 2025-01-08 PROCEDURE — 99285 EMERGENCY DEPT VISIT HI MDM: CPT

## 2025-01-08 PROCEDURE — 71045 X-RAY EXAM CHEST 1 VIEW: CPT

## 2025-01-08 PROCEDURE — 87804 INFLUENZA ASSAY W/OPTIC: CPT | Performed by: EMERGENCY MEDICINE

## 2025-01-08 PROCEDURE — 99223 1ST HOSP IP/OBS HIGH 75: CPT | Performed by: STUDENT IN AN ORGANIZED HEALTH CARE EDUCATION/TRAINING PROGRAM

## 2025-01-08 RX ORDER — AZITHROMYCIN 250 MG/1
250 TABLET, FILM COATED ORAL EVERY 24 HOURS
Qty: 3 TABLET | Refills: 0 | Status: SHIPPED | OUTPATIENT
Start: 2025-01-08 | End: 2025-01-11

## 2025-01-08 RX ORDER — PREDNISONE 20 MG/1
60 TABLET ORAL DAILY
Qty: 12 TABLET | Refills: 0 | Status: SHIPPED | OUTPATIENT
Start: 2025-01-08 | End: 2025-01-08

## 2025-01-08 RX ORDER — IPRATROPIUM BROMIDE AND ALBUTEROL SULFATE 2.5; .5 MG/3ML; MG/3ML
3 SOLUTION RESPIRATORY (INHALATION) ONCE
Status: COMPLETED | OUTPATIENT
Start: 2025-01-08 | End: 2025-01-08

## 2025-01-08 RX ORDER — PREDNISONE 10 MG/1
TABLET ORAL
Qty: 30 TABLET | Refills: 0 | Status: SHIPPED | OUTPATIENT
Start: 2025-01-08 | End: 2025-01-20

## 2025-01-08 RX ORDER — METHYLPREDNISOLONE SODIUM SUCCINATE 40 MG/ML
40 INJECTION, POWDER, LYOPHILIZED, FOR SOLUTION INTRAMUSCULAR; INTRAVENOUS EVERY 8 HOURS SCHEDULED
Status: DISCONTINUED | OUTPATIENT
Start: 2025-01-08 | End: 2025-01-08 | Stop reason: HOSPADM

## 2025-01-08 RX ORDER — AZITHROMYCIN 250 MG/1
250 TABLET, FILM COATED ORAL EVERY 24 HOURS
Qty: 3 TABLET | Refills: 0 | Status: CANCELLED | OUTPATIENT
Start: 2025-01-08 | End: 2025-01-11

## 2025-01-08 RX ORDER — ALBUTEROL SULFATE 90 UG/1
2 INHALANT RESPIRATORY (INHALATION) EVERY 6 HOURS PRN
Qty: 8.5 G | Refills: 0 | Status: SHIPPED | OUTPATIENT
Start: 2025-01-08

## 2025-01-08 RX ORDER — PREDNISONE 10 MG/1
TABLET ORAL
Qty: 30 TABLET | Refills: 0 | Status: CANCELLED | OUTPATIENT
Start: 2025-01-08 | End: 2025-01-20

## 2025-01-08 RX ORDER — PREDNISONE 20 MG/1
60 TABLET ORAL ONCE
Status: COMPLETED | OUTPATIENT
Start: 2025-01-08 | End: 2025-01-08

## 2025-01-08 RX ORDER — PANTOPRAZOLE SODIUM 40 MG/1
40 TABLET, DELAYED RELEASE ORAL DAILY
Qty: 30 TABLET | Refills: 0 | Status: SHIPPED | OUTPATIENT
Start: 2025-01-08

## 2025-01-08 RX ORDER — IPRATROPIUM BROMIDE AND ALBUTEROL SULFATE 2.5; .5 MG/3ML; MG/3ML
3 SOLUTION RESPIRATORY (INHALATION)
Status: DISCONTINUED | OUTPATIENT
Start: 2025-01-08 | End: 2025-01-08 | Stop reason: HOSPADM

## 2025-01-08 RX ADMIN — IPRATROPIUM BROMIDE AND ALBUTEROL SULFATE 3 ML: 2.5; .5 SOLUTION RESPIRATORY (INHALATION) at 05:09

## 2025-01-08 RX ADMIN — METHYLPREDNISOLONE SODIUM SUCCINATE 40 MG: 40 INJECTION, POWDER, FOR SOLUTION INTRAMUSCULAR; INTRAVENOUS at 08:28

## 2025-01-08 RX ADMIN — IPRATROPIUM BROMIDE AND ALBUTEROL SULFATE 3 ML: 2.5; .5 SOLUTION RESPIRATORY (INHALATION) at 06:39

## 2025-01-08 RX ADMIN — IPRATROPIUM BROMIDE AND ALBUTEROL SULFATE 3 ML: 2.5; .5 SOLUTION RESPIRATORY (INHALATION) at 08:27

## 2025-01-08 RX ADMIN — IPRATROPIUM BROMIDE AND ALBUTEROL SULFATE 3 ML: 2.5; .5 SOLUTION RESPIRATORY (INHALATION) at 04:10

## 2025-01-08 RX ADMIN — PREDNISONE 60 MG: 20 TABLET ORAL at 04:10

## 2025-01-08 NOTE — H&P
H&P - Hospitalist   Name: José Luis Gallagher 69 y.o. male I MRN: [id number]  Unit/Bed#: ED 12 I Date of Admission: 1/8/2025   Date of Service: 1/8/2025 I Hospital Day: 0     Assessment & Plan  COPD exacerbation (HCC)  69-year-old male patient with past medical history of CAD, hyperlipidemia, COPD, presents to [facility] room with vomiting of shortness of breath and hospitalized due to COPD exacerbation    CBC noted without leukocytosis, platelet 129,000  CMP noted with mild hyponatremia 133.  Troponin negative x 3.  .  Flu/COVID rapid antigen test negative.  Chest x-ray report noted negative for acute finding.    Plan was to continue with IV Solu-Medrol and monitor for hypoxia however upon my encounter in the emergency room patient adamantly requesting to go home AGAINST MEDICAL ADVICE.  Currently O2 saturation 91-92% on room air.  Discussed risk of leaving AGAINST MEDICAL ADVICE at length with patient who verbalized understanding of all risk associated with leaving AMA including but not admitted to worsening shortness of breath, hypoxia, coma and/or death.  Sent prescription for azithromycin for 3-day course as well as tapering dose of prednisone to his pharmacy on chart.  Discussed with patient to seek immediate medical attention if experiencing worsening shortness of breath or any other symptoms discussed at length.  Called francisco j Briones around 10:33 AM however did not answer my phone.  Called Kiara however phone number listed is not active.  Coronary artery disease involving native coronary artery of native heart  Reported history of CAD.  Currently asymptomatic.  Continue home dose of Plavix, Lipitor  HLD (hyperlipidemia)  Continue Lipitor      VTE Pharmacologic Prophylaxis:   Moderate Risk (Score 3-4) - Pharmacological DVT Prophylaxis Ordered: enoxaparin (Lovenox).  Code Status: Level 1 - Full Code   Discussion with family: Attempted to update  (daughter) via phone. Unable to  contact.    Anticipated Length of Stay: Patient will be admitted on an inpatient basis with an anticipated length of stay of greater than 2 midnights secondary to COPD exacerbation.    History of Present Illness   Chief Complaint: Shortness of breath, cough.    José Luis Gallagher is a 69 y.o. male with a PMH of CAD, hyperlipidemia, COPD, active smoker, history of bilateral renal cell carcinoma post right radical nephrectomy, left cryoablation, BPH, who presents with complaining of cough, shortness of breath.  Patient had received p.o. prednisone 60 mg along with DuoNebs in the emergency room and reported feeling better.  On my encounter patient reports that he wants to go home.  Currently O2 saturation 92% on room air.  Plan was to hospitalize for COPD exacerbation and treat with IV Solu-Medrol, nebs, monitor pulse oximetry however patient is adamant about going home against advice.  I had lengthy discussions regarding risks of leaving AGAINST MEDICAL ADVICE as stated above and patient verbalized understanding and signed AMA form and left.  I have sent prescription for azithromycin and tapering dose of prednisone to his pharmacy on the chart.  Also discussed with patient at length regarding seeking immediate medical attention if he were to experience any of the worsening symptoms as discussed with him at length.    Review of Systems   Constitutional:  Negative for chills, diaphoresis, fatigue and fever.   HENT:  Negative for congestion.    Eyes:  Negative for visual disturbance.   Respiratory:  Positive for cough and shortness of breath.    Cardiovascular:  Negative for chest pain, palpitations and leg swelling.   Gastrointestinal:  Negative for abdominal pain, nausea and vomiting.   Endocrine: Negative for polyuria.   Genitourinary:  Negative for dysuria and hematuria.   Neurological:  Negative for weakness.   Psychiatric/Behavioral:  Negative for agitation.        Historical Information   Past Medical History:    Diagnosis Date    ASHD (arteriosclerotic heart disease)     Left renal mass     Mixed hyperlipidemia     Presence of stent in coronary artery     Renal cell carcinoma, right (HCC)     Smoker      Past Surgical History:   Procedure Laterality Date    COLONOSCOPY      CORONARY STENT PLACEMENT      In [place]    FRACTURE SURGERY      NEPHRECTOMY RADICAL      ORIF TIBIA & FIBULA FRACTURES      SHOULDER ARTHROSCOPY      TONSILLECTOMY      WISDOM TOOTH EXTRACTION      WOUND DEBRIDEMENT Left 10/11/2023    Procedure: EXCISIONAL DEBRIDEMENT left buttock;  Surgeon: Melissa Venegas MD;  Location: [facility];  Service: General     Social History     Tobacco Use    Smoking status: Every Day     Current packs/day: 1.00     Average packs/day: 1 pack/day for 46.0 years (46.0 ttl pk-yrs)     Types: Cigarettes     Passive exposure: Past    Smokeless tobacco: Never   Vaping Use    Vaping status: Never Used   Substance and Sexual Activity    Alcohol use: Not Currently    Drug use: Not Currently    Sexual activity: Not on file     E-Cigarette/Vaping    E-Cigarette Use Never User      E-Cigarette/Vaping Substances    Nicotine No     THC No     CBD No     Flavoring No     Other No     Unknown No      Family History   Problem Relation Age of Onset    Coronary artery disease Father      Social History:  Marital Status:      Meds/Allergies   I have reviewed home medications with patient personally.  Prior to Admission medications    Medication Sig Start Date End Date Taking? Authorizing Provider   albuterol (ProAir HFA) 90 mcg/act inhaler Inhale 2 puffs every 6 (six) hours as needed for wheezing 1/8/25  Yes Rufino Tony MD   azithromycin (ZITHROMAX) 250 mg tablet Take 1 tablet (250 mg total) by mouth every 24 hours for 3 days 1/8/25 1/11/25 Yes Cecil TATE MD   pantoprazole (PROTONIX) 40 mg tablet Take 1 tablet (40 mg total) by mouth daily 1/8/25  Yes Cecil TATE MD   predniSONE 10 mg tablet Take 4 tablets (40 mg  total) by mouth daily for 3 days, THEN 3 tablets (30 mg total) daily for 3 days, THEN 2 tablets (20 mg total) daily for 3 days, THEN 1 tablet (10 mg total) daily for 3 days. 1/8/25 1/20/25 Yes Cecil TATE MD   predniSONE 20 mg tablet Take 3 tablets (60 mg total) by mouth daily for 4 days 1/8/25 1/8/25 Yes Rufino Tony MD   ALPRAZolam (XANAX) 2 MG tablet Take 2 mg by mouth daily at bedtime as needed  8/16/19   Historical Provider, MD   ASPIRIN 81 PO Take 81 mg by mouth daily    Historical Provider, MD   atorvastatin (LIPITOR) 40 mg tablet Take 40 mg by mouth daily at bedtime 3/6/17   Historical Provider, MD   celecoxib (CeleBREX) 200 mg capsule Take 200 mg by mouth 2 (two) times a day  8/16/19   Historical Provider, MD   clopidogrel (PLAVIX) 75 mg tablet Take 75 mg by mouth daily 3/2/17   Historical Provider, MD   doxazosin (CARDURA) 4 mg tablet Take 4 mg by mouth daily  3/2/17   Historical Provider, MD   finasteride (PROSCAR) 5 mg tablet Take 5 mg by mouth daily 3/2/17   Historical Provider, MD   oxybutynin (DITROPAN-XL) 5 mg 24 hr tablet  10/9/23   Historical Provider, MD   oxyCODONE (ROXICODONE) 30 MG immediate release tablet Take 30 mg by mouth 4 (four) times a day 4/4/17   Historical Provider, MD     No Known Allergies    Objective :  Temp:  [98.7 °F (37.1 °C)] 98.7 °F (37.1 °C)  HR:  [60-69] 62  BP: (127-155)/(67-92) 155/70  Resp:  [18-21] 18  SpO2:  [90 %-98 %] 96 %  O2 Device: Nasal cannula  Nasal Cannula O2 Flow Rate (L/min):  [3 L/min] 3 L/min    Physical Exam  Constitutional:       General: He is not in acute distress.     Appearance: Normal appearance. He is not ill-appearing, toxic-appearing or diaphoretic.   Cardiovascular:      Rate and Rhythm: Normal rate.      Pulses: Normal pulses.   Pulmonary:      Effort: Pulmonary effort is normal. No respiratory distress.      Breath sounds: Normal breath sounds. No stridor. No wheezing.      Comments: Current able to speak in full sentences and not in  "acute respite distress.  O2 saturation 92% on room air.  Abdominal:      General: Bowel sounds are normal. There is no distension.      Palpations: Abdomen is soft.      Tenderness: There is no abdominal tenderness. There is no guarding.   Musculoskeletal:      Right lower leg: No edema.      Left lower leg: No edema.   Neurological:      Mental Status: He is alert and oriented to person, place, and time. Mental status is at baseline.   Psychiatric:         Mood and Affect: Mood normal.         Behavior: Behavior normal.          Lines/Drains:            Lab Results: I have reviewed the following results:  Results from last 7 days   Lab Units 01/08/25  0353   WBC Thousand/uL 4.44   HEMOGLOBIN g/dL 14.5   HEMATOCRIT % 43.6   PLATELETS Thousands/uL 129*   SEGS PCT % 41*   LYMPHO PCT % 34   MONO PCT % 18*   EOS PCT % 5     Results from last 7 days   Lab Units 01/08/25  0353   SODIUM mmol/L 133*   POTASSIUM mmol/L 3.9   CHLORIDE mmol/L 103   CO2 mmol/L 23   BUN mg/dL 15   CREATININE mg/dL 1.06   ANION GAP mmol/L 7   CALCIUM mg/dL 8.4   ALBUMIN g/dL 3.7   TOTAL BILIRUBIN mg/dL 0.55   ALK PHOS U/L 55   ALT U/L 7   AST U/L 17   GLUCOSE RANDOM mg/dL 123             No results found for: \"HGBA1C\"        Imaging Results Review: I reviewed radiology reports from this admission including: chest xray.      Administrative Statements   I have spent a total time of 75 minutes in caring for this patient on the day of the visit/encounter including Diagnostic results, Risks and benefits of tx options, Patient and family education, Importance of tx compliance, Impressions, Counseling / Coordination of care, Documenting in the medical record, Reviewing / ordering tests, medicine, procedures  , and Obtaining or reviewing history  .    ** Please Note: This note has been constructed using a voice recognition system. **    "

## 2025-01-08 NOTE — ED NOTES
Provider at bedside with patient explaining AMA risks. Patient acknowledged risks and stated he wants to go home.      Neda Ortega RN  01/08/25 [id number]

## 2025-01-08 NOTE — DISCHARGE SUMMARY
Discharge Summary - Hospitalist   Name: José Luis Gallagher 69 y.o. male I MRN: [id number]  Unit/Bed#: ED 12 I Date of Admission: 1/8/2025   Date of Service: 1/8/2025 I Hospital Day: 1         Left AGAINST MEDICAL ADVICE  Assessment & Plan  COPD exacerbation (HCC)  69-year-old male patient with past medical history of CAD, hyperlipidemia, COPD, presents to [facility] with vomiting of shortness of breath and hospitalized due to COPD exacerbation    CBC noted without leukocytosis, platelet 129,000  CMP noted with mild hyponatremia 133.  Troponin negative x 3.  .  Flu/COVID rapid antigen test negative.  Chest x-ray report noted negative for acute finding.    Plan was to continue with IV Solu-Medrol and monitor for hypoxia however upon my encounter in the emergency room patient adamantly requesting to go home AGAINST MEDICAL ADVICE.  Currently O2 saturation 91-92% on room air.  Discussed risk of leaving AGAINST MEDICAL ADVICE at length with patient who verbalized understanding of all risk associated with leaving AMA including but not admitted to worsening shortness of breath, hypoxia, coma and/or death.  Sent prescription for azithromycin for 3-day course as well as tapering dose of prednisone to his pharmacy on chart.  Discussed with patient to seek immediate medical attention if experiencing worsening shortness of breath or any other symptoms discussed at length.  Called daughter Anali around 10:33 AM however did not answer my phone.  Called Kiara however phone number listed is not active.  Coronary artery disease involving native coronary artery of native heart  Reported history of CAD.  Currently asymptomatic.  Continue home dose of Plavix, Lipitor  HLD (hyperlipidemia)  Continue Lipitor     Medical Problems       Resolved Problems  Date Reviewed: 12/5/2023   None       Discharging Physician / Practitioner: Cecil Chairez MD  PCP: No primary care provider on file.  Admission Date:   Admission  Orders (From admission, onward)       Ordered        01/08/25 0821  Inpatient Admission  Once                          Discharge Date: 01/08/25        Reason for Admission: COPD exacerbation    Hospital Course:   José Luis Gallagher is a 69 y.o. male with a PMH of CAD, hyperlipidemia, COPD, active smoker, history of bilateral renal cell carcinoma post right radical nephrectomy, left cryoablation, BPH, who presents with complaining of cough, shortness of breath.  Patient had received p.o. prednisone 60 mg along with DuoNebs in the emergency room and reported feeling better.  On my encounter patient reports that he wants to go home.  Currently O2 saturation 92% on room air.  Plan was to hospitalize for COPD exacerbation and treat with IV Solu-Medrol, nebs, monitor pulse oximetry however patient is adamant about going home against advice.  I had lengthy discussions regarding risks of leaving AGAINST MEDICAL ADVICE as stated above and patient verbalized understanding and signed AMA form and left.  I have sent prescription for azithromycin and tapering dose of prednisone to his pharmacy on the chart.  Also discussed with patient at length regarding seeking immediate medical attention if he were to experience any of the worsening symptoms as discussed with him at length.  Refer to earlier notes for further clarification.        Please see above list of diagnoses and related plan for additional information.     Condition at Discharge: good    Discharge Day Visit / Exam:   * Please refer to separate progress note for these details *    Discussion with Family: Attempted to update  (daughter) via phone. Unable to contact.    Discharge instructions/Information to patient and family:   See after visit summary for information provided to patient and family.      Provisions for Follow-Up Care:  See after visit summary for information related to follow-up care and any pertinent home health orders.      Mobility at  time of Discharge:           Disposition:   Other: Patient left AGAINST MEDICAL ADVICE    Planned Readmission:     Discharge Medications:  See after visit summary for reconciled discharge medications provided to patient and/or family.      Administrative Statements   Discharge Statement:  I have spent a total time of 35 minutes in caring for this patient on the day of the visit/encounter. >30 minutes of time was spent on: Diagnostic results, Instructions for management, Patient and family education, Importance of tx compliance, Impressions, Counseling / Coordination of care, Documenting in the medical record, and Reviewing / ordering tests, medicine, procedures  .    **Please Note: This note may have been constructed using a voice recognition system**

## 2025-01-08 NOTE — ED NOTES
Patient refusing vital signs at this time, removed o2 and pulse ox      Neda Ortega RN  01/08/25 0957

## 2025-01-08 NOTE — ED NOTES
Patient currently stating that he does not want to be taken upstairs and stay in the hospital. Provider made aware.      Neda Ortega RN  01/08/25 0918

## 2025-01-08 NOTE — ED NOTES
Pt confirms with this RN that they are refusing admission to hospital.      Juanjo Linda, RN  01/08/25 1000

## 2025-01-08 NOTE — ED PROVIDER NOTES
"  ED Disposition       None          Assessment & Plan   {Hyperlinks  Risk Stratification - NIHSS - HEART SCORE - Fill out sepsis note and make sure you call [phone] if severe or septic shock:[id number]}    Medical Decision Making  HPI  69 y.o. male presents with a chief complaints of \"I been having trouble breathing because of the car.\"    Patient conversant upon initial evaluation, able to provide information regarding history.  Patient without signs of hypoxia and initial pulse oxymetry without hypoxemia; no immediate intervention required.    Patient affirms 2 days of intermittent dyspnea that occurs while he is driving and resolves following this. Patient states nothing clearly worsens the dyspnea and an old inhaler his was previously given somewhat improves the dyspnea.    Patient states the symptoms have resolved at present.  Patient notes he had a history of prior issues with exhaust from a car and he plans to going to see  tomorrow to address his current car.  Patient denies to sitting inside the car in an enclosed structure while the car was running and states this occurs while he is driving outside    Patient denies any acute cough though he notes a chronic cough that he attributes to smoking.  Patient states he has been attempting to reduce how much he smokes.  Patient denies chest pain.    Patient denies acute leg pain or swelling.    Patient denies any significant acute weight gain.    Patient denies any fever/chills.   Patient affirms rhinorrhea several days ago when he was around an individual who was ill but this has resolved at present.     Patient denies hematochezia or melanotic stools, nausea/vomiting, new rashes, diaphoresis, palpitations, weakness, dizziness, syncope, focal weakness or paresthesia.  All other review of systems reviewed and noted to be negative.    Patient denies a history of pulmonary disease though he has an extensive smoking history.    Patient denies a history of " atherosclerotic disease (CAD/TIA/CVA/PAD).   Patient denies any history of prior cardiopulmonary surgery.     Patient denies any immobilization of at least 3 days or surgery in the past 4 weeks.    Patient denies any history of DVT or PE.    Patient denies any history or family history of thrombophilia.  Patient denies any malignancy with treatment within the past 6 months.  Patient has a history of renal cell cancer for which he was treated with surgery a few years ago and has had no active treatment though he does follow-up for surveillance.  Patient denies any extended travel greater than 10 hours ([place] 2003).     Focused Objective.  PHYSICAL EXAM:  Constitutional:  No acute distress  Neck:  No asymmetry without obvious masses or swelling; no tracheal deviation.  No jugular venous distention.  No stridor.  No bruit.  CV:  Regular rate and rhythm. No murmur. Peripheral pulses intact and equal.  Respiratory:  Normal inspection with no rash, signs of infection, or trauma.  No intercostal, supraclavicular, subcostal, or substernal retractions.  No tenderness or crepitus on palpitation.  Auscultation demonstrates wheezes bilaterally at the bases with decreased air movement.    Medical Decision Making  Patient presents with dyspnea representing a broad differential, including multiple emergent diagnoses.  Given the large differential, the decision making in this case is of high complexity.    Patient presents afebrile without history of fever/chills making infectious etiologies less likely based upon available information.  Patient does note rhinorrhea following sick contacts so we will send COVID influenza testing, less likely based on clinical examination.    Patient has a past medical history suggestive of possibly undiagnosed COPD.    EKG obtained and reviewed independently by myself, which was interpreted by myself as sinus bradycardia rate of 59 without acute ST segment changes.  Patient placed on cardiac  "monitoring.    CXR will be obtained to evaluate for alternative pathologies, including pneumothorax, pulmonary edema, or pneumonia.      Laboratory analysis including CBC to evaluate for anemia and evaluate potential for significant leukocytosis. Will obtain BMP to evaluate renal function and electrolytes including possibility of metabolic derangement accounting for patient's symptoms.   Considering patient's history, will obtain troponin to evaluate for ACS.  Will send carboxyhemoglobin levels considering patient's reported exhaust issues however he denies any use in an enclosed space making this less likely.  More concerns for potential undiagnosed COPD exacerbated by the exhaust fumes.    Considering patient's history and examination, will attempt symptomatic management with nebulized medications and corticosteroids.  Patient will be placed on continuous cardiopulmonary monitoring and reassessed.              Amount and/or Complexity of Data Reviewed  Labs: ordered. Decision-making details documented in ED Course.  Radiology: ordered and independent interpretation performed.    Risk  Prescription drug management.        ED Course as of 01/08/25 0636   Wed Jan 08, 2025   0413 Carbon Monoxide, Blood(!): 4.2  Patient is a smoker which makes this within normal range.   0542 Straight sinus bradycardia rate of 59 without acute ST segment changes.       Medications - No data to display    ED Risk Strat Scores                                              History of Present Illness   {Hyperlinks  History (Med, Surg, Fam, Social) - Current Medications - Allergies  :[id number]}    Chief Complaint   Patient presents with    Shortness of Breath     Cough, congestion, and shortness of breath after being exposed to sick contact earlier this week. Also reports believing they have \"muffler\" problems and may have been exposed to their car's exhaust.        Past Medical History:   Diagnosis Date    ASHD (arteriosclerotic heart " disease)     Left renal mass     Mixed hyperlipidemia     Presence of stent in coronary artery     Renal cell carcinoma, right (HCC)     Smoker       Past Surgical History:   Procedure Laterality Date    COLONOSCOPY      CORONARY STENT PLACEMENT      In [place]    FRACTURE SURGERY      NEPHRECTOMY RADICAL      ORIF TIBIA & FIBULA FRACTURES      SHOULDER ARTHROSCOPY      TONSILLECTOMY      WISDOM TOOTH EXTRACTION      WOUND DEBRIDEMENT Left 10/11/2023    Procedure: EXCISIONAL DEBRIDEMENT left buttock;  Surgeon: Melissa Venegas MD;  Location: [facility];  Service: General      Family History   Problem Relation Age of Onset    Coronary artery disease Father       Social History     Tobacco Use    Smoking status: Every Day     Current packs/day: 1.00     Average packs/day: 1 pack/day for 46.0 years (46.0 ttl pk-yrs)     Types: Cigarettes     Passive exposure: Past    Smokeless tobacco: Never   Vaping Use    Vaping status: Never Used   Substance Use Topics    Alcohol use: Not Currently    Drug use: Not Currently      E-Cigarette/Vaping    E-Cigarette Use Never User       E-Cigarette/Vaping Substances    Nicotine No     THC No     CBD No     Flavoring No     Other No     Unknown No       I have reviewed and agree with the history as documented.     HPI    Review of Systems        Objective   {Hyperlinks  Historical Vitals - Historical Labs - Chart Review/Microbiology - Last Echo - Code Status  :[id number]}    ED Triage Vitals [01/08/25 0337]   Temperature Pulse Blood Pressure Respirations SpO2 Patient Position - Orthostatic VS   98.7 °F (37.1 °C) 60 127/92 19 97 % Sitting      Temp Source Heart Rate Source BP Location FiO2 (%) Pain Score    Oral Monitor Right arm -- --      Vitals      Date and Time Temp Pulse SpO2 Resp BP Pain Score FACES Pain Rating User   01/08/25 0337 98.7 °F (37.1 °C) 60 97 % 19 127/92 -- -- TOMA            Physical Exam    Results Reviewed       Procedure Component Value Units Date/Time     Carboxyhemoglobin [[id number]]     Lab Status: No result Specimen: Blood     CBC and differential [[id number]]     Lab Status: No result Specimen: Blood     Comprehensive metabolic panel [[id number]]     Lab Status: No result Specimen: Blood     HS Troponin 0hr (reflex protocol) [[id number]]     Lab Status: No result Specimen: Blood             XR chest 1 view portable    (Results Pending)       Procedures    ED Medication and Procedure Management   Prior to Admission Medications   Prescriptions Last Dose Informant Patient Reported? Taking?   ALPRAZolam (XANAX) 2 MG tablet  Self Yes No   Sig: Take 2 mg by mouth daily at bedtime as needed    ASPIRIN 81 PO  Self Yes No   Sig: Take 81 mg by mouth daily   atorvastatin (LIPITOR) 40 mg tablet  Self Yes No   Sig: Take 40 mg by mouth daily at bedtime   celecoxib (CeleBREX) 200 mg capsule  Self Yes No   Sig: Take 200 mg by mouth 2 (two) times a day    clopidogrel (PLAVIX) 75 mg tablet  Self Yes No   Sig: Take 75 mg by mouth daily   doxazosin (CARDURA) 4 mg tablet  Self Yes No   Sig: Take 4 mg by mouth daily    finasteride (PROSCAR) 5 mg tablet  Self Yes No   Sig: Take 5 mg by mouth daily   oxyCODONE (ROXICODONE) 30 MG immediate release tablet  Self Yes No   Sig: Take 30 mg by mouth 4 (four) times a day   oxybutynin (DITROPAN-XL) 5 mg 24 hr tablet  Self Yes No      Facility-Administered Medications: None     Patient's Medications   Discharge Prescriptions    No medications on file     No discharge procedures on file.  ED SEPSIS DOCUMENTATION          evaluation for possible pulmonary embolism, in the appropriate (Well's Score of 4 or less) patient, the age adjusted d-dimer cutoff for this patient can be calculated as:    Age x 0.01 (in ug/mL) for Age-adjusted D-dimer exclusion threshold for a patient over 50 years.    Blood gas, venous [[id number]]  (Abnormal) Collected: 01/08/25 0642    Lab Status: Final result Specimen: Blood from Arm, Left Updated: 01/08/25 0646     pH, Reyes 7.378     pCO2, Reyes 43.7 mm Hg      pO2, Reyes 53.0 mm Hg      HCO3, Reyes 25.2 mmol/L      Base Excess, Reyes -0.2 mmol/L      O2 Content, Reyes 18.0 ml/dL      O2 HGB, VENOUS 84.0 %     HS Troponin I 2hr [[id number]]  (Normal) Collected: 01/08/25 0605    Lab Status: Final result Specimen: Blood from Arm, Left Updated: 01/08/25 0636     hs TnI 2hr 7 ng/L      Delta 2hr hsTnI 0 ng/L     FLU/COVID Rapid Antigen (30 min. TAT) - Preferred screening test in ED [[id number]]  (Normal) Collected: 01/08/25 0408    Lab Status: Final result Specimen: Nares from Nose Updated: 01/08/25 0431     SARS COV Rapid Antigen Negative     Influenza A Rapid Antigen Negative     Influenza B Rapid Antigen Negative    Narrative:      This test has been performed using the Quidel Rachael 2 FLU+SARS Antigen test under the Emergency Use Authorization (EUA). This test has been validated by the  and verified by the performing laboratory. The Rachael uses lateral flow immunofluorescent sandwich assay to detect SARS-COV, Influenza A and Influenza B Antigen.     The Quidel Rachael 2 SARS Antigen test does not differentiate between SARS-CoV and SARS-CoV-2.     Negative results are presumptive and may be confirmed with a molecular assay, if necessary, for patient management. Negative results do not rule out SARS-CoV-2 or influenza infection and should not be used as the sole basis for treatment or patient management decisions. A negative test result may occur if the level of antigen in a sample is below the limit of detection of  this test.     Positive results are indicative of the presence of viral antigens, but do not rule out bacterial infection or co-infection with other viruses.     All test results should be used as an adjunct to clinical observations and other information available to the provider.    FOR PEDIATRIC PATIENTS - copy/paste COVID Guidelines URL to browser: [email or web address]    HS Troponin 0hr (reflex protocol) [[id number]]  (Normal) Collected: 01/08/25 0353    Lab Status: Final result Specimen: Blood from Arm, Left Updated: 01/08/25 0421     hs TnI 0hr 7 ng/L     Comprehensive metabolic panel [[id number]]  (Abnormal) Collected: 01/08/25 0353    Lab Status: Final result Specimen: Blood from Arm, Left Updated: 01/08/25 6967     Sodium 133 mmol/L      Potassium 3.9 mmol/L      Chloride 103 mmol/L      CO2 23 mmol/L      ANION GAP 7 mmol/L      BUN 15 mg/dL      Creatinine 1.06 mg/dL      Glucose 123 mg/dL      Calcium 8.4 mg/dL      AST 17 U/L      ALT 7 U/L      Alkaline Phosphatase 55 U/L      Total Protein 6.2 g/dL      Albumin 3.7 g/dL      Total Bilirubin 0.55 mg/dL      eGFR 70 ml/min/1.73sq m     Narrative:      National Kidney Disease Foundation guidelines for Chronic Kidney Disease (CKD):     Stage 1 with normal or high GFR (GFR > 90 mL/min/1.73 square meters)    Stage 2 Mild CKD (GFR = 60-89 mL/min/1.73 square meters)    Stage 3A Moderate CKD (GFR = 45-59 mL/min/1.73 square meters)    Stage 3B Moderate CKD (GFR = 30-44 mL/min/1.73 square meters)    Stage 4 Severe CKD (GFR = 15-29 mL/min/1.73 square meters)    Stage 5 End Stage CKD (GFR <15 mL/min/1.73 square meters)  Note: GFR calculation is accurate only with a steady state creatinine    Carboxyhemoglobin [[id number]]  (Abnormal) Collected: 01/08/25 0408    Lab Status: Final result Specimen: Blood from Arm, Left Updated: 01/08/25 2343     Carbon Monoxide, Blood 4.2 %     Narrative:      Therapeutic levels (1  mg/mL and 2 mg/mL) of hydroxocobalamin may interfere with the fCOHb and fMetHb where it may cause lower than expected values  Normal Carboxyhemoglobin range for nonsmokers is <1.5%   Normal Carboxyhemoglobin range for smokers is 1.5% to 5.1%     CBC and differential [[id number]]  (Abnormal) Collected: 01/08/25 0352    Lab Status: Final result Specimen: Blood from Arm, Left Updated: 01/08/25 0402     WBC 4.44 Thousand/uL      RBC 4.81 Million/uL      Hemoglobin 14.5 g/dL      Hematocrit 43.6 %      MCV 91 fL      MCH 30.1 pg      MCHC 33.3 g/dL      RDW 13.1 %      MPV 10.2 fL      Platelets 129 Thousands/uL      nRBC 0 /100 WBCs      Segmented % 41 %      Immature Grans % 1 %      Lymphocytes % 34 %      Monocytes % 18 %      Eosinophils Relative 5 %      Basophils Relative 1 %      Absolute Neutrophils 1.86 Thousands/µL      Absolute Immature Grans 0.02 Thousand/uL      Absolute Lymphocytes 1.50 Thousands/µL      Absolute Monocytes 0.81 Thousand/µL      Eosinophils Absolute 0.22 Thousand/µL      Basophils Absolute 0.03 Thousands/µL             XR chest 1 view portable   ED Interpretation by Rufino Tony MD (01/08 0413)   Appears grossly similar to the prior x-ray in 2019 without acute findings      Final Interpretation by Dameon Vallejo MD (01/08 0801)      No acute cardiopulmonary disease.            Workstation performed: [id number]             Procedures    ED Medication and Procedure Management   Prior to Admission Medications   Prescriptions Last Dose Informant Patient Reported? Taking?   ALPRAZolam (XANAX) 2 MG tablet  Self Yes No   Sig: Take 2 mg by mouth daily at bedtime as needed    ASPIRIN 81 PO  Self Yes No   Sig: Take 81 mg by mouth daily   atorvastatin (LIPITOR) 40 mg tablet  Self Yes No   Sig: Take 40 mg by mouth daily at bedtime   celecoxib (CeleBREX) 200 mg capsule  Self Yes No   Sig: Take 200 mg by mouth 2 (two) times a day    clopidogrel (PLAVIX) 75 mg tablet  Self Yes No   Sig: Take 75 mg by  mouth daily   doxazosin (CARDURA) 4 mg tablet  Self Yes No   Sig: Take 4 mg by mouth daily    finasteride (PROSCAR) 5 mg tablet  Self Yes No   Sig: Take 5 mg by mouth daily   oxyCODONE (ROXICODONE) 30 MG immediate release tablet  Self Yes No   Sig: Take 30 mg by mouth 4 (four) times a day   oxybutynin (DITROPAN-XL) 5 mg 24 hr tablet  Self Yes No      Facility-Administered Medications: None     Discharge Medication List as of 1/8/2025 10:26 AM        START taking these medications    Details   albuterol (ProAir HFA) 90 mcg/act inhaler Inhale 2 puffs every 6 (six) hours as needed for wheezing, Starting Wed 1/8/2025, Print      predniSONE 20 mg tablet Take 3 tablets (60 mg total) by mouth daily for 4 days, Starting Wed 1/8/2025, Until Sun 1/12/2025, Print           CONTINUE these medications which have NOT CHANGED    Details   ALPRAZolam (XANAX) 2 MG tablet Take 2 mg by mouth daily at bedtime as needed , Starting Fri 8/16/2019, Historical Med      ASPIRIN 81 PO Take 81 mg by mouth daily, Historical Med      atorvastatin (LIPITOR) 40 mg tablet Take 40 mg by mouth daily at bedtime, Starting Mon 3/6/2017, Historical Med      celecoxib (CeleBREX) 200 mg capsule Take 200 mg by mouth 2 (two) times a day , Starting Fri 8/16/2019, Historical Med      clopidogrel (PLAVIX) 75 mg tablet Take 75 mg by mouth daily, Starting Thu 3/2/2017, Historical Med      doxazosin (CARDURA) 4 mg tablet Take 4 mg by mouth daily , Starting Thu 3/2/2017, Historical Med      finasteride (PROSCAR) 5 mg tablet Take 5 mg by mouth daily, Starting Thu 3/2/2017, Historical Med      oxybutynin (DITROPAN-XL) 5 mg 24 hr tablet Historical Med      oxyCODONE (ROXICODONE) 30 MG immediate release tablet Take 30 mg by mouth 4 (four) times a day, Starting Tue 4/4/2017, Historical Med             ED SEPSIS DOCUMENTATION   Time reflects when diagnosis was documented in both MDM as applicable and the Disposition within this note       Time User Action Codes  Description Comment    1/8/2025  6:37 AM Rufino Tony Add [R06.00] Dyspnea                  Rufino Tony MD  01/13/25 0335

## 2025-01-08 NOTE — Clinical Note
Case was discussed with RENAN and the patient's admission status was agreed to be Admission Status: inpatient status to the service of Dr. Chairez .

## 2025-01-09 NOTE — ASSESSMENT & PLAN NOTE
69-year-old male patient with past medical history of CAD, hyperlipidemia, COPD, presents to [facility] with vomiting of shortness of breath and hospitalized due to COPD exacerbation    CBC noted without leukocytosis, platelet 129,000  CMP noted with mild hyponatremia 133.  Troponin negative x 3.  .  Flu/COVID rapid antigen test negative.  Chest x-ray report noted negative for acute finding.    Plan was to continue with IV Solu-Medrol and monitor for hypoxia however upon my encounter in the emergency room patient adamantly requesting to go home AGAINST MEDICAL ADVICE.  Currently O2 saturation 91-92% on room air.  Discussed risk of leaving AGAINST MEDICAL ADVICE at length with patient who verbalized understanding of all risk associated with leaving AMA including but not admitted to worsening shortness of breath, hypoxia, coma and/or death.  Sent prescription for azithromycin for 3-day course as well as tapering dose of prednisone to his pharmacy on chart.  Discussed with patient to seek immediate medical attention if experiencing worsening shortness of breath or any other symptoms discussed at length.  Called francisco j Briones around 10:33 AM however did not answer my phone.  Called Kiara however phone number listed is not active.

## 2025-01-20 ENCOUNTER — APPOINTMENT (EMERGENCY)
Dept: VASCULAR ULTRASOUND | Facility: HOSPITAL | Age: 70
End: 2025-01-20
Payer: MEDICARE

## 2025-01-20 ENCOUNTER — HOSPITAL ENCOUNTER (EMERGENCY)
Facility: HOSPITAL | Age: 70
Discharge: HOME | End: 2025-01-20
Payer: MEDICARE

## 2025-01-20 VITALS
SYSTOLIC BLOOD PRESSURE: 140 MMHG | RESPIRATION RATE: 16 BRPM | TEMPERATURE: 97.8 F | DIASTOLIC BLOOD PRESSURE: 83 MMHG | OXYGEN SATURATION: 99 % | HEART RATE: 83 BPM

## 2025-01-20 DIAGNOSIS — I80.9 SUPERFICIAL THROMBOPHLEBITIS: Primary | ICD-10-CM

## 2025-01-20 PROCEDURE — 93971 EXTREMITY STUDY: CPT

## 2025-01-20 PROCEDURE — 99284 EMERGENCY DEPT VISIT MOD MDM: CPT

## 2025-01-20 PROCEDURE — 99283 EMERGENCY DEPT VISIT LOW MDM: CPT

## 2025-01-20 NOTE — DISCHARGE INSTRUCTIONS
Warm compresses, tylenol for pain  Will take time to go away  Elevation, compression  Follow up with primary care provider

## 2025-01-20 NOTE — ED PROVIDER NOTES
Time reflects when diagnosis was documented in both MDM as applicable and the Disposition within this note       Time User Action Codes Description Comment    1/20/2025 12:10 PM Pauly Ricks Add [I80.9] Superficial thrombophlebitis           ED Disposition       ED Disposition   Discharge    Condition   Stable    Date/Time   Mon Jan 20, 2025 12:10 PM    Comment   José Luis Gallagher discharge to home/self care.                   Assessment & Plan       Medical Decision Making  This patient presents with initial presentation of local erythema, warmth, swelling of left AC. Patient had vascular duplex- negative for DVT. Positive for superficial thrombophlebitis.  Patient recommended warm compresses. Unable to have NSAIDs.   No lymphatic spread visible and no fluid pockets or fluctuance concerning for abscess noted.  Low concern for osteomyelitis.  No immune compromise, bullae or pain out of proportion concerning for necrotizing fasciitis.  Patient to be discharged home with antibiotics and to follow-up with primary care provider.  Return to the ER symptoms worsens or questions or concerns arise at home.          ED Course as of 01/20/25 1254   Mon Jan 20, 2025   1209 Negative for DVT, positive for superficialthrombophelbitis.         Medications - No data to display    ED Risk Strat Scores                                              History of Present Illness       Chief Complaint   Patient presents with    Arm Pain     Pt with arm pain after getting bloodwork done the other day.  States the pain is from his elbow down to his wrist.        Past Medical History:   Diagnosis Date    ASHD (arteriosclerotic heart disease)     Left renal mass     Mixed hyperlipidemia     Presence of stent in coronary artery     Renal cell carcinoma, right (HCC)     Smoker       Past Surgical History:   Procedure Laterality Date    COLONOSCOPY      CORONARY STENT PLACEMENT      In [place]    FRACTURE SURGERY      NEPHRECTOMY RADICAL       ORIF TIBIA & FIBULA FRACTURES      SHOULDER ARTHROSCOPY      TONSILLECTOMY      WISDOM TOOTH EXTRACTION      WOUND DEBRIDEMENT Left 10/11/2023    Procedure: EXCISIONAL DEBRIDEMENT left buttock;  Surgeon: Melissa Venegas MD;  Location: [facility];  Service: General      Family History   Problem Relation Age of Onset    Coronary artery disease Father       Social History     Tobacco Use    Smoking status: Every Day     Current packs/day: 1.00     Average packs/day: 1 pack/day for 46.0 years (46.0 ttl pk-yrs)     Types: Cigarettes     Passive exposure: Past    Smokeless tobacco: Never   Vaping Use    Vaping status: Never Used   Substance Use Topics    Alcohol use: Not Currently    Drug use: Not Currently      E-Cigarette/Vaping    E-Cigarette Use Never User       E-Cigarette/Vaping Substances    Nicotine No     THC No     CBD No     Flavoring No     Other No     Unknown No       I have reviewed and agree with the history as documented.     70 y/o male patient presents to the ER for evaluation of arm pain. Patient reports that he was recently admitted and had an IV. He reports that since he left he has been having left AC pain and tenderness. He reports that he started to have erythema, warmth. No noted ecchymosis, open wounds, drainage. Patient takes plavix daily and has been compliant.         Review of Systems   Constitutional:  Negative for chills and fever.   HENT:  Negative for ear pain and sore throat.    Eyes:  Negative for pain and visual disturbance.   Respiratory:  Negative for cough and shortness of breath.    Cardiovascular:  Negative for chest pain and palpitations.   Gastrointestinal:  Negative for abdominal pain and vomiting.   Genitourinary:  Negative for dysuria and hematuria.   Musculoskeletal:  Positive for arthralgias (left arm pain-AC). Negative for back pain.   Skin:  Positive for rash. Negative for color change.   Neurological:  Negative for seizures and syncope.   All other systems reviewed  and are negative.          Objective       ED Triage Vitals [01/20/25 1014]   Temperature Pulse Blood Pressure Respirations SpO2 Patient Position - Orthostatic VS   97.8 °F (36.6 °C) 83 140/83 16 99 % Sitting      Temp Source Heart Rate Source BP Location FiO2 (%) Pain Score    Temporal Monitor Left arm -- --      Vitals      Date and Time Temp Pulse SpO2 Resp BP Pain Score FACES Pain Rating User   01/20/25 1014 97.8 °F (36.6 °C) 83 99 % 16 140/83 -- -- RG            Physical Exam  Vitals and nursing note reviewed.   Constitutional:       General: He is not in acute distress.     Appearance: He is well-developed.   HENT:      Head: Normocephalic and atraumatic.   Eyes:      Conjunctiva/sclera: Conjunctivae normal.   Cardiovascular:      Rate and Rhythm: Normal rate and regular rhythm.      Heart sounds: No murmur heard.  Pulmonary:      Effort: Pulmonary effort is normal. No respiratory distress.      Breath sounds: Normal breath sounds.   Abdominal:      Palpations: Abdomen is soft.      Tenderness: There is no abdominal tenderness.   Musculoskeletal:         General: No swelling.      Cervical back: Neck supple.   Skin:     General: Skin is warm and dry.      Capillary Refill: Capillary refill takes less than 2 seconds.             Comments: No drainage, ecchymosis   Neurological:      Mental Status: He is alert.   Psychiatric:         Mood and Affect: Mood normal.         Results Reviewed       None            VAS upper limb venous duplex scan, unilateral/limited    (Results Pending)       Procedures    ED Medication and Procedure Management   Prior to Admission Medications   Prescriptions Last Dose Informant Patient Reported? Taking?   ALPRAZolam (XANAX) 2 MG tablet  Self Yes No   Sig: Take 2 mg by mouth daily at bedtime as needed    ASPIRIN 81 PO  Self Yes No   Sig: Take 81 mg by mouth daily   albuterol (ProAir HFA) 90 mcg/act inhaler   No No   Sig: Inhale 2 puffs every 6 (six) hours as needed for wheezing    atorvastatin (LIPITOR) 40 mg tablet  Self Yes No   Sig: Take 40 mg by mouth daily at bedtime   celecoxib (CeleBREX) 200 mg capsule  Self Yes No   Sig: Take 200 mg by mouth 2 (two) times a day    clopidogrel (PLAVIX) 75 mg tablet  Self Yes No   Sig: Take 75 mg by mouth daily   doxazosin (CARDURA) 4 mg tablet  Self Yes No   Sig: Take 4 mg by mouth daily    finasteride (PROSCAR) 5 mg tablet  Self Yes No   Sig: Take 5 mg by mouth daily   oxyCODONE (ROXICODONE) 30 MG immediate release tablet  Self Yes No   Sig: Take 30 mg by mouth 4 (four) times a day   oxybutynin (DITROPAN-XL) 5 mg 24 hr tablet  Self Yes No   pantoprazole (PROTONIX) 40 mg tablet   No No   Sig: Take 1 tablet (40 mg total) by mouth daily   predniSONE 10 mg tablet   No No   Sig: Take 4 tablets (40 mg total) by mouth daily for 3 days, THEN 3 tablets (30 mg total) daily for 3 days, THEN 2 tablets (20 mg total) daily for 3 days, THEN 1 tablet (10 mg total) daily for 3 days.      Facility-Administered Medications: None     Discharge Medication List as of 1/20/2025 12:13 PM        CONTINUE these medications which have NOT CHANGED    Details   albuterol (ProAir HFA) 90 mcg/act inhaler Inhale 2 puffs every 6 (six) hours as needed for wheezing, Starting Wed 1/8/2025, Print      ALPRAZolam (XANAX) 2 MG tablet Take 2 mg by mouth daily at bedtime as needed , Starting Fri 8/16/2019, Historical Med      ASPIRIN 81 PO Take 81 mg by mouth daily, Historical Med      atorvastatin (LIPITOR) 40 mg tablet Take 40 mg by mouth daily at bedtime, Starting Mon 3/6/2017, Historical Med      celecoxib (CeleBREX) 200 mg capsule Take 200 mg by mouth 2 (two) times a day , Starting Fri 8/16/2019, Historical Med      clopidogrel (PLAVIX) 75 mg tablet Take 75 mg by mouth daily, Starting Thu 3/2/2017, Historical Med      doxazosin (CARDURA) 4 mg tablet Take 4 mg by mouth daily , Starting Thu 3/2/2017, Historical Med      finasteride (PROSCAR) 5 mg tablet Take 5 mg by mouth daily,  Starting Thu 3/2/2017, Historical Med      oxybutynin (DITROPAN-XL) 5 mg 24 hr tablet Historical Med      oxyCODONE (ROXICODONE) 30 MG immediate release tablet Take 30 mg by mouth 4 (four) times a day, Starting Tue 4/4/2017, Historical Med      pantoprazole (PROTONIX) 40 mg tablet Take 1 tablet (40 mg total) by mouth daily, Starting Wed 1/8/2025, Normal      predniSONE 10 mg tablet Multiple Dosages:Starting Wed 1/8/2025, Until Fri 1/10/2025 at 2359, THEN Starting Sat 1/11/2025, Until Mon 1/13/2025 at 2359, THEN Starting Tue 1/14/2025, Until Thu 1/16/2025 at 2359, THEN Starting Fri 1/17/2025, Until Sun 1/19/2025 at 2359Take 4 t ablets (40 mg total) by mouth daily for 3 days, THEN 3 tablets (30 mg total) daily for 3 days, THEN 2 tablets (20 mg total) daily for 3 days, THEN 1 tablet (10 mg total) daily for 3 days., Normal           No discharge procedures on file.  ED SEPSIS DOCUMENTATION   Time reflects when diagnosis was documented in both MDM as applicable and the Disposition within this note       Time User Action Codes Description Comment    1/20/2025 12:10 PM Pauly Ricks Add [I80.9] Superficial thrombophlebitis                  CHEYENNE Santiago  01/20/25 5924

## 2025-01-21 PROCEDURE — 93971 EXTREMITY STUDY: CPT | Performed by: INTERNAL MEDICINE

## 2025-01-30 LAB
ATRIAL RATE: 59 BPM
ATRIAL RATE: 62 BPM
P AXIS: 57 DEGREES
P AXIS: 64 DEGREES
PR INTERVAL: 152 MS
PR INTERVAL: 152 MS
QRS AXIS: -11 DEGREES
QRS AXIS: -13 DEGREES
QRSD INTERVAL: 102 MS
QRSD INTERVAL: 106 MS
QT INTERVAL: 410 MS
QT INTERVAL: 416 MS
QTC INTERVAL: 405 MS
QTC INTERVAL: 422 MS
T WAVE AXIS: 17 DEGREES
T WAVE AXIS: 42 DEGREES
VENTRICULAR RATE: 59 BPM
VENTRICULAR RATE: 62 BPM

## 2025-02-18 ENCOUNTER — CONSULT (OUTPATIENT)
Age: 70
End: 2025-02-18
Payer: COMMERCIAL

## 2025-02-18 VITALS
HEART RATE: 80 BPM | SYSTOLIC BLOOD PRESSURE: 124 MMHG | WEIGHT: 211 LBS | TEMPERATURE: 98.2 F | OXYGEN SATURATION: 94 % | DIASTOLIC BLOOD PRESSURE: 80 MMHG | BODY MASS INDEX: 30.21 KG/M2 | HEIGHT: 70 IN

## 2025-02-18 DIAGNOSIS — R06.00 DYSPNEA: Primary | ICD-10-CM

## 2025-02-18 DIAGNOSIS — F17.210 NICOTINE DEPENDENCE, CIGARETTES, UNCOMPLICATED: ICD-10-CM

## 2025-02-18 PROBLEM — R06.09 DYSPNEA ON EXERTION: Status: ACTIVE | Noted: 2025-02-18

## 2025-02-18 PROBLEM — F17.200 NICOTINE DEPENDENCE WITH CURRENT USE: Status: ACTIVE | Noted: 2025-02-18

## 2025-02-18 PROBLEM — J44.1 COPD EXACERBATION (HCC): Status: RESOLVED | Noted: 2025-01-08 | Resolved: 2025-02-18

## 2025-02-18 PROCEDURE — G2211 COMPLEX E/M VISIT ADD ON: HCPCS | Performed by: INTERNAL MEDICINE

## 2025-02-18 PROCEDURE — 99204 OFFICE O/P NEW MOD 45 MIN: CPT | Performed by: INTERNAL MEDICINE

## 2025-02-18 PROCEDURE — 99406 BEHAV CHNG SMOKING 3-10 MIN: CPT | Performed by: INTERNAL MEDICINE

## 2025-02-18 RX ORDER — NICOTINE 21 MG/24HR
1 PATCH, TRANSDERMAL 24 HOURS TRANSDERMAL EVERY 24 HOURS
Qty: 28 PATCH | Refills: 0 | Status: SHIPPED | OUTPATIENT
Start: 2025-02-18

## 2025-02-18 NOTE — ASSESSMENT & PLAN NOTE
44 PY currently smoking 1PPD  Never had lung ca screening    - lung ca screening  - smoking cessation discussed for 5 min

## 2025-02-18 NOTE — PROGRESS NOTES
"Name: José Luis Gallagher      : 1955      MRN: [id number]  Encounter Provider: Pau Samson MD  Encounter Date: 2025   Encounter department: [facility]  :  Assessment & Plan  Dyspnea  46 PY smoker  Reports mild chronic cough but no significant dyspnea  No prior PFTs  Recent normal CXR    Recently treated in  for COPD exacerbation in setting of fumes from car vs URI, declined admission    - Complete PFTs  - albuterol prn  Orders:    Ambulatory referral to Pulmonology    Complete PFT with post Bronchodilator and Six Minute walk; Future    Nicotine dependence, cigarettes, uncomplicated  44 PY currently smoking 1+PPD  Never had lung ca screening  Tried to stop smoking before and gained 20 lb    - nicotine patch, worked before, declines gum  - lung ca screening  - smoking cessation discussed for 5 min   Orders:    CT lung screening program; Future    nicotine (NICODERM CQ) 21 mg/24 hr TD 24 hr patch; Place 1 patch on the skin over 24 hours every 24 hours    Follow up 3 months      History of Present Illness   HPI  José Luis Gallagher is a 69 y.o. male with a history of CKD, RCC, smoking, CAD,  who presents for evaluation of dyspnea    44Py currently 1+PPD    Recently presented to ED with dyspnea, also had wheezing, discharged on prednisone    Reports chronic cough  Mild FARRELL    Does think that its triggered by car exhaust fumes     Uses albuterol prn       Objective   /80   Pulse 80   Temp 98.2 °F (36.8 °C)   Ht 5' 10\" (1.778 m)   Wt 95.7 kg (211 lb)   SpO2 94%   BMI 30.28 kg/m²        Exam:   Appearance -- NAD, speaking full sentences  Neuro -- A&Ox3, wnl  Heart -- RRR, no murmurs  Lungs -- distant breath sounds but no wheezing  Abdomen -- soft, NTND,  Extremities -- WWP, no edema  Skin -- no rash    Imaging:    CXR 2025: clear lungs    PFTs:    None    Other:      I have spent a total time of 41 minutes in caring for this patient on the day " of the visit/encounter including Diagnostic results, Prognosis, Risks and benefits of tx options, Instructions for management, Impressions, Counseling / Coordination of care, and Documenting in the medical record.

## 2025-02-18 NOTE — ASSESSMENT & PLAN NOTE
46 PY smoker  Reports mild chronic cough but no significant dyspnea  No prior PFTs  Recent normal CXR    Recently treated in [place] for COPD exacerbation in setting of fumes from car vs URI, declined admission    - Complete PFTs  - albuterol prn  Orders:    Ambulatory referral to Pulmonology    Complete PFT with post Bronchodilator and Six Minute walk; Future

## 2025-02-18 NOTE — ASSESSMENT & PLAN NOTE
44 PY currently smoking 1+PPD  Never had lung ca screening  Tried to stop smoking before and gained 20 lb    - nicotine patch, worked before, declines gum  - lung ca screening  - smoking cessation discussed for 5 min   Orders:    CT lung screening program; Future    nicotine (NICODERM CQ) 21 mg/24 hr TD 24 hr patch; Place 1 patch on the skin over 24 hours every 24 hours

## (undated) DEVICE — POOLE SUCTION HANDLE: Brand: CARDINAL HEALTH

## (undated) DEVICE — DRAPE EQUIPMENT RF WAND

## (undated) DEVICE — BETHLEHEM UNIVERSAL MINOR GEN: Brand: CARDINAL HEALTH

## (undated) DEVICE — GLOVE SRG BIOGEL 7

## (undated) DEVICE — LIGHT HANDLE COVER SLEEVE DISP BLUE STELLAR

## (undated) DEVICE — CURITY PLAIN PACKING STRIP: Brand: CURITY

## (undated) DEVICE — PAD GROUNDING ADULT

## (undated) DEVICE — HYDROPHILIC WOUND DRESSING WITH ZINC PLUS VITAMINS A AND B6.: Brand: DERMAGRAN®-B

## (undated) DEVICE — 3M™ STERI-STRIP™ REINFORCED ADHESIVE SKIN CLOSURES, R1542, 1/4 IN X 1-1/2 IN (6 MM X 38 MM), 6 STRIPS/ENVELOPE: Brand: 3M™ STERI-STRIP™

## (undated) DEVICE — INTENDED FOR TISSUE SEPARATION, AND OTHER PROCEDURES THAT REQUIRE A SHARP SURGICAL BLADE TO PUNCTURE OR CUT.: Brand: BARD-PARKER SAFETY BLADES SIZE 15, STERILE

## (undated) DEVICE — PLUMEPEN PRO 10FT

## (undated) DEVICE — SPONGE STICK WITH PVP-I: Brand: KENDALL

## (undated) DEVICE — 3M™ TEGADERM™ TRANSPARENT FILM DRESSING FRAME STYLE, 1624W, 2-3/8 IN X 2-3/4 IN (6 CM X 7 CM), 100/CT 4CT/CASE: Brand: 3M™ TEGADERM™

## (undated) DEVICE — NEEDLE 25G X 1 1/2

## (undated) DEVICE — SCD SEQUENTIAL COMPRESSION COMFORT SLEEVE MEDIUM KNEE LENGTH: Brand: KENDALL SCD

## (undated) DEVICE — 4-PORT MANIFOLD: Brand: NEPTUNE 2

## (undated) DEVICE — TUBING SUCTION 5MM X 12 FT